# Patient Record
Sex: FEMALE | Race: WHITE | NOT HISPANIC OR LATINO | Employment: FULL TIME | URBAN - METROPOLITAN AREA
[De-identification: names, ages, dates, MRNs, and addresses within clinical notes are randomized per-mention and may not be internally consistent; named-entity substitution may affect disease eponyms.]

---

## 2017-04-06 ENCOUNTER — HOSPITAL ENCOUNTER (EMERGENCY)
Facility: HOSPITAL | Age: 27
Discharge: LEFT BEFORE DISCHARGE | End: 2017-04-07

## 2017-04-06 VITALS
BODY MASS INDEX: 20.24 KG/M2 | SYSTOLIC BLOOD PRESSURE: 122 MMHG | TEMPERATURE: 97.5 F | RESPIRATION RATE: 18 BRPM | DIASTOLIC BLOOD PRESSURE: 85 MMHG | OXYGEN SATURATION: 100 % | HEIGHT: 62 IN | HEART RATE: 82 BPM | WEIGHT: 110 LBS

## 2017-04-06 LAB
BILIRUB UR QL STRIP: NEGATIVE
CLARITY UR: ABNORMAL
COLOR UR: YELLOW
GLUCOSE UR STRIP-MCNC: NEGATIVE MG/DL
HGB UR QL STRIP.AUTO: NEGATIVE
KETONES UR QL STRIP: NEGATIVE
LEUKOCYTE ESTERASE UR QL STRIP.AUTO: NEGATIVE
NITRITE UR QL STRIP: NEGATIVE
PH UR STRIP.AUTO: 7 [PH] (ref 5–8)
PROT UR QL STRIP: NEGATIVE
SP GR UR STRIP: 1.02 (ref 1–1.03)
UROBILINOGEN UR QL STRIP: ABNORMAL

## 2017-04-06 PROCEDURE — 99211 OFF/OP EST MAY X REQ PHY/QHP: CPT

## 2017-04-06 PROCEDURE — 81003 URINALYSIS AUTO W/O SCOPE: CPT
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
## 2017-06-23 ENCOUNTER — HOSPITAL ENCOUNTER (EMERGENCY)
Facility: HOSPITAL | Age: 27
Discharge: HOME | End: 2017-06-23
Attending: EMERGENCY MEDICINE | Admitting: EMERGENCY MEDICINE

## 2017-06-23 ENCOUNTER — APPOINTMENT (OUTPATIENT)
Dept: ULTRASOUND IMAGING | Facility: HOSPITAL | Age: 27
End: 2017-06-23

## 2017-06-23 VITALS
WEIGHT: 120 LBS | BODY MASS INDEX: 22.08 KG/M2 | OXYGEN SATURATION: 99 % | HEART RATE: 76 BPM | HEIGHT: 62 IN | SYSTOLIC BLOOD PRESSURE: 104 MMHG | DIASTOLIC BLOOD PRESSURE: 67 MMHG | RESPIRATION RATE: 18 BRPM | TEMPERATURE: 98.5 F

## 2017-06-23 DIAGNOSIS — N93.9 VAGINAL SPOTTING: Primary | ICD-10-CM

## 2017-06-23 DIAGNOSIS — Z34.90 EARLY STAGE OF PREGNANCY: ICD-10-CM

## 2017-06-23 LAB
ABO GROUP BLD: NORMAL
ANION GAP SERPL CALCULATED.3IONS-SCNC: 13.2 MMOL/L
BACTERIA UR QL AUTO: NORMAL /HPF
BASOPHILS # BLD AUTO: 0.01 10*3/MM3 (ref 0–0.2)
BASOPHILS NFR BLD AUTO: 0.1 % (ref 0–1.5)
BILIRUB UR QL STRIP: NEGATIVE
BUN BLD-MCNC: 12 MG/DL (ref 6–20)
BUN/CREAT SERPL: 17.6 (ref 7–25)
CALCIUM SPEC-SCNC: 9.8 MG/DL (ref 8.6–10.5)
CHLORIDE SERPL-SCNC: 100 MMOL/L (ref 98–107)
CLARITY UR: CLEAR
CLUE CELLS SPEC QL WET PREP: ABNORMAL
CO2 SERPL-SCNC: 24.8 MMOL/L (ref 22–29)
COLOR UR: YELLOW
CREAT BLD-MCNC: 0.68 MG/DL (ref 0.57–1)
DEPRECATED RDW RBC AUTO: 45.6 FL (ref 37–54)
EOSINOPHIL # BLD AUTO: 0.1 10*3/MM3 (ref 0–0.7)
EOSINOPHIL NFR BLD AUTO: 1.3 % (ref 0.3–6.2)
ERYTHROCYTE [DISTWIDTH] IN BLOOD BY AUTOMATED COUNT: 13.6 % (ref 11.7–13)
GFR SERPL CREATININE-BSD FRML MDRD: 105 ML/MIN/1.73
GLUCOSE BLD-MCNC: 93 MG/DL (ref 65–99)
GLUCOSE UR STRIP-MCNC: NEGATIVE MG/DL
HCG INTACT+B SERPL-ACNC: 2862 MIU/ML
HCT VFR BLD AUTO: 40.7 % (ref 35.6–45.5)
HGB BLD-MCNC: 13.6 G/DL (ref 11.9–15.5)
HGB UR QL STRIP.AUTO: ABNORMAL
HOLD SPECIMEN: NORMAL
HYALINE CASTS UR QL AUTO: NORMAL /LPF
HYDATID CYST SPEC WET PREP: ABNORMAL
IMM GRANULOCYTES # BLD: 0 10*3/MM3 (ref 0–0.03)
IMM GRANULOCYTES NFR BLD: 0 % (ref 0–0.5)
KETONES UR QL STRIP: NEGATIVE
KOH PREP NAIL: NORMAL
LEUKOCYTE ESTERASE UR QL STRIP.AUTO: NEGATIVE
LYMPHOCYTES # BLD AUTO: 1.57 10*3/MM3 (ref 0.9–4.8)
LYMPHOCYTES NFR BLD AUTO: 20.3 % (ref 19.6–45.3)
MCH RBC QN AUTO: 30.3 PG (ref 26.9–32)
MCHC RBC AUTO-ENTMCNC: 33.4 G/DL (ref 32.4–36.3)
MCV RBC AUTO: 90.6 FL (ref 80.5–98.2)
MONOCYTES # BLD AUTO: 0.87 10*3/MM3 (ref 0.2–1.2)
MONOCYTES NFR BLD AUTO: 11.2 % (ref 5–12)
NEUTROPHILS # BLD AUTO: 5.19 10*3/MM3 (ref 1.9–8.1)
NEUTROPHILS NFR BLD AUTO: 67.1 % (ref 42.7–76)
NITRITE UR QL STRIP: NEGATIVE
PH UR STRIP.AUTO: 6.5 [PH] (ref 5–8)
PLATELET # BLD AUTO: 221 10*3/MM3 (ref 140–500)
PMV BLD AUTO: 9.1 FL (ref 6–12)
POTASSIUM BLD-SCNC: 3.9 MMOL/L (ref 3.5–5.2)
PROT UR QL STRIP: NEGATIVE
RBC # BLD AUTO: 4.49 10*6/MM3 (ref 3.9–5.2)
RBC # UR: NORMAL /HPF
REF LAB TEST METHOD: NORMAL
RH BLD: POSITIVE
SODIUM BLD-SCNC: 138 MMOL/L (ref 136–145)
SP GR UR STRIP: 1.02 (ref 1–1.03)
SQUAMOUS #/AREA URNS HPF: NORMAL /HPF
T VAGINALIS SPEC QL WET PREP: ABNORMAL
UROBILINOGEN UR QL STRIP: ABNORMAL
WBC NRBC COR # BLD: 7.74 10*3/MM3 (ref 4.5–10.7)
WBC SPEC QL WET PREP: ABNORMAL
WBC UR QL AUTO: NORMAL /HPF
WHOLE BLOOD HOLD SPECIMEN: NORMAL
WHOLE BLOOD HOLD SPECIMEN: NORMAL
YEAST GENITAL QL WET PREP: ABNORMAL

## 2017-06-23 PROCEDURE — 93976 VASCULAR STUDY: CPT

## 2017-06-23 PROCEDURE — 87591 N.GONORRHOEAE DNA AMP PROB: CPT | Performed by: PHYSICIAN ASSISTANT

## 2017-06-23 PROCEDURE — 36415 COLL VENOUS BLD VENIPUNCTURE: CPT | Performed by: EMERGENCY MEDICINE

## 2017-06-23 PROCEDURE — 99284 EMERGENCY DEPT VISIT MOD MDM: CPT

## 2017-06-23 PROCEDURE — 76801 OB US < 14 WKS SINGLE FETUS: CPT

## 2017-06-23 PROCEDURE — 84702 CHORIONIC GONADOTROPIN TEST: CPT | Performed by: EMERGENCY MEDICINE

## 2017-06-23 PROCEDURE — 86900 BLOOD TYPING SEROLOGIC ABO: CPT | Performed by: EMERGENCY MEDICINE

## 2017-06-23 PROCEDURE — 81001 URINALYSIS AUTO W/SCOPE: CPT | Performed by: EMERGENCY MEDICINE

## 2017-06-23 PROCEDURE — 86901 BLOOD TYPING SEROLOGIC RH(D): CPT | Performed by: EMERGENCY MEDICINE

## 2017-06-23 PROCEDURE — 87220 TISSUE EXAM FOR FUNGI: CPT | Performed by: PHYSICIAN ASSISTANT

## 2017-06-23 PROCEDURE — 80048 BASIC METABOLIC PNL TOTAL CA: CPT | Performed by: EMERGENCY MEDICINE

## 2017-06-23 PROCEDURE — 87210 SMEAR WET MOUNT SALINE/INK: CPT | Performed by: PHYSICIAN ASSISTANT

## 2017-06-23 PROCEDURE — 85025 COMPLETE CBC W/AUTO DIFF WBC: CPT | Performed by: EMERGENCY MEDICINE

## 2017-06-23 PROCEDURE — 87491 CHLMYD TRACH DNA AMP PROBE: CPT | Performed by: PHYSICIAN ASSISTANT

## 2017-06-23 PROCEDURE — 76817 TRANSVAGINAL US OBSTETRIC: CPT

## 2017-06-23 RX ORDER — SODIUM CHLORIDE 0.9 % (FLUSH) 0.9 %
10 SYRINGE (ML) INJECTION AS NEEDED
Status: DISCONTINUED | OUTPATIENT
Start: 2017-06-23 | End: 2017-06-23 | Stop reason: HOSPADM

## 2017-06-23 RX ORDER — METRONIDAZOLE 500 MG/1
2000 TABLET ORAL ONCE
Status: COMPLETED | OUTPATIENT
Start: 2017-06-23 | End: 2017-06-23

## 2017-06-23 RX ADMIN — METRONIDAZOLE 2000 MG: 500 TABLET, FILM COATED ORAL at 20:23

## 2017-06-23 NOTE — ED NOTES
Pt reports not bleeding anymore although c/o LLQ abd pain that she relates to an ovarian cyst. Reassurance given; call light in reach. Pts breathing even and unlabored. Pt appears in NAD at this time. Family at bedside.      Lata Mejia RN  06/23/17 1925

## 2017-06-23 NOTE — ED PROVIDER NOTES
EMERGENCY DEPARTMENT ENCOUNTER    CHIEF COMPLAINT  Chief Complaint: Vaginal bleeding  History given by:Pt  History limited by:None  Room Number:   PMD: Provider Not In System  Dr. Cevallos - OB/GYN    HPI:  Pt is a 27 y.o. female who presents with ittermittent bright red vaginal spotting onset a few days ago. She also states she had 1 episode of abd cramping which has resolved. She reports she is pregnant. .     Duration: few days   Timing:intermittent   Location:n/a  Radiation:n/a  Quality:bright red spotting  Intensity/Severity:mild  Progression:unchanged  Associated Symptoms:abd cramping  Aggravating Factors:none  Alleviating Factors:none  Previous Episodes:none  Treatment before arrival:none    MEDICAL RECORD REVIEW  In [facility]    PAST MEDICAL HISTORY  Active Ambulatory Problems     Diagnosis Date Noted   • No Active Ambulatory Problems     Resolved Ambulatory Problems     Diagnosis Date Noted   • No Resolved Ambulatory Problems     Past Medical History:   Diagnosis Date   • Cancer    • PTSD (post-traumatic stress disorder)        PAST SURGICAL HISTORY  History reviewed. No pertinent surgical history.    FAMILY HISTORY  Family History   Problem Relation Age of Onset   • Family history unknown: Yes       SOCIAL HISTORY  Social History     Social History   • Marital status:      Spouse name: N/A   • Number of children: N/A   • Years of education: N/A     Occupational History   • Not on file.     Social History Main Topics   • Smoking status: Current Some Day Smoker   • Smokeless tobacco: Not on file   • Alcohol use Yes      Comment: social drinker   • Drug use: Yes     Special: Marijuana   • Sexual activity: Yes     Partners: Female     Other Topics Concern   • Not on file     Social History Narrative       ALLERGIES  Orange fruit [citrus]    REVIEW OF SYSTEMS  Review of Systems   Constitutional: Negative for fever.   HENT: Negative for sore throat.    Eyes: Negative.    Respiratory: Negative for cough  and shortness of breath.    Cardiovascular: Negative for chest pain.   Gastrointestinal: Positive for abdominal pain (1 episode of cramping, resolved). Negative for diarrhea and vomiting.   Genitourinary: Positive for vaginal bleeding (spotting). Negative for dysuria.   Musculoskeletal: Negative for neck pain.   Skin: Negative for rash.   Allergic/Immunologic: Negative.    Neurological: Negative for weakness, numbness and headaches.   Hematological: Negative.    Psychiatric/Behavioral: Negative.    All other systems reviewed and are negative.      PHYSICAL EXAM  ED Triage Vitals   Temp Heart Rate Resp BP SpO2   06/23/17 1616 06/23/17 1616 06/23/17 1616 06/23/17 1659 06/23/17 1616   98 °F (36.7 °C) 106 18 116/73 100 %      Temp src Heart Rate Source Patient Position BP Location FiO2 (%)   06/23/17 1616 06/23/17 1616 -- -- --   Tympanic Monitor          Physical Exam   Constitutional: She is oriented to person, place, and time and well-developed, well-nourished, and in no distress. No distress.   HENT:   Head: Normocephalic and atraumatic.   Mouth/Throat: Oropharynx is clear and moist.   Eyes: EOM are normal.   Neck: Normal range of motion. Neck supple.   Cardiovascular: Normal rate and regular rhythm.    Pulmonary/Chest: Effort normal and breath sounds normal. No respiratory distress. She has no wheezes. She exhibits no tenderness.   Abdominal: Soft. She exhibits no distension. There is no tenderness. There is no rebound.   Genitourinary:   Genitourinary Comments: Chaparoned pelvic exam.  Cervical os is closed.  Minimal vaginal bleeding in the vaginal vault.  Bimanual exam without cervical motion tenderness, suprapubic tenderness, or adexal tenderness.  Tolerated exam well.       Musculoskeletal: Normal range of motion. She exhibits no edema.   Lymphadenopathy:     She has no cervical adenopathy.   Neurological: She is alert and oriented to person, place, and time.   Skin: Skin is warm and dry. No rash noted. No  pallor.   Psychiatric: Mood, memory, affect and judgment normal.   Nursing note and vitals reviewed.      LAB RESULTS  Recent Results (from the past 24 hour(s))   Basic Metabolic Panel    Collection Time: 06/23/17  5:09 PM   Result Value Ref Range    Glucose 93 65 - 99 mg/dL    BUN 12 6 - 20 mg/dL    Creatinine 0.68 0.57 - 1.00 mg/dL    Sodium 138 136 - 145 mmol/L    Potassium 3.9 3.5 - 5.2 mmol/L    Chloride 100 98 - 107 mmol/L    CO2 24.8 22.0 - 29.0 mmol/L    Calcium 9.8 8.6 - 10.5 mg/dL    eGFR Non African Amer 105 >60 mL/min/1.73    BUN/Creatinine Ratio 17.6 7.0 - 25.0    Anion Gap 13.2 mmol/L   hCG, Quantitative, Pregnancy    Collection Time: 06/23/17  5:09 PM   Result Value Ref Range    HCG Quantitative 2862.00 mIU/mL   ABO / Rh    Collection Time: 06/23/17  5:09 PM   Result Value Ref Range    ABO Type O     RH type Positive    Light Blue Top    Collection Time: 06/23/17  5:09 PM   Result Value Ref Range    Extra Tube hold for add-on    Green Top (Gel)    Collection Time: 06/23/17  5:09 PM   Result Value Ref Range    Extra Tube Hold for add-ons.    Lavender Top    Collection Time: 06/23/17  5:09 PM   Result Value Ref Range    Extra Tube hold for add-on    CBC Auto Differential    Collection Time: 06/23/17  5:09 PM   Result Value Ref Range    WBC 7.74 4.50 - 10.70 10*3/mm3    RBC 4.49 3.90 - 5.20 10*6/mm3    Hemoglobin 13.6 11.9 - 15.5 g/dL    Hematocrit 40.7 35.6 - 45.5 %    MCV 90.6 80.5 - 98.2 fL    MCH 30.3 26.9 - 32.0 pg    MCHC 33.4 32.4 - 36.3 g/dL    RDW 13.6 (H) 11.7 - 13.0 %    RDW-SD 45.6 37.0 - 54.0 fl    MPV 9.1 6.0 - 12.0 fL    Platelets 221 140 - 500 10*3/mm3    Neutrophil % 67.1 42.7 - 76.0 %    Lymphocyte % 20.3 19.6 - 45.3 %    Monocyte % 11.2 5.0 - 12.0 %    Eosinophil % 1.3 0.3 - 6.2 %    Basophil % 0.1 0.0 - 1.5 %    Immature Grans % 0.0 0.0 - 0.5 %    Neutrophils, Absolute 5.19 1.90 - 8.10 10*3/mm3    Lymphocytes, Absolute 1.57 0.90 - 4.80 10*3/mm3    Monocytes, Absolute 0.87 0.20 - 1.20  10*3/mm3    Eosinophils, Absolute 0.10 0.00 - 0.70 10*3/mm3    Basophils, Absolute 0.01 0.00 - 0.20 10*3/mm3    Immature Grans, Absolute 0.00 0.00 - 0.03 10*3/mm3   Urinalysis With / Culture If Indicated    Collection Time: 06/23/17  6:11 PM   Result Value Ref Range    Color, UA Yellow Yellow, Straw    Appearance, UA Clear Clear    pH, UA 6.5 5.0 - 8.0    Specific Gravity, UA 1.023 1.005 - 1.030    Glucose, UA Negative Negative    Ketones, UA Negative Negative    Bilirubin, UA Negative Negative    Blood, UA Trace (A) Negative    Protein, UA Negative Negative    Leuk Esterase, UA Negative Negative    Nitrite, UA Negative Negative    Urobilinogen, UA 0.2 E.U./dL 0.2 - 1.0 E.U./dL   Urinalysis, Microscopic Only    Collection Time: 06/23/17  6:11 PM   Result Value Ref Range    RBC, UA 0-2 None Seen, 0-2 /HPF    WBC, UA 0-2 None Seen, 0-2 /HPF    Bacteria, UA None Seen None Seen /HPF    Squamous Epithelial Cells, UA 0-2 None Seen, 0-2 /HPF    Hyaline Casts, UA None Seen None Seen /LPF    Methodology Automated Microscopy    Wet Prep, Genital    Collection Time: 06/23/17  7:27 PM   Result Value Ref Range    YEAST No yeast seen No yeast seen    HYPHAL ELEMENTS No Hyphal elements seen No Hyphal elements seen    WBC'S 2+ WBC's seen (A) No WBC's seen    Clue Cells, Wet Prep No Clue cells seen No Clue cells seen    Trichomonas, Wet Prep 2+ Trichomonas seen (A) No Trichomonas seen   KOH Prep    Collection Time: 06/23/17  7:27 PM   Result Value Ref Range    KOH Prep No yeast or hyphal elements seen No yeast or hyphal elements seen       I ordered the above labs and reviewed the results    RADIOLOGY  US Ob < 14 Weeks Single or First Gestation   Final Result       Evidence of intrauterine pregnancy. No extrauterine pregnancy is   identified. Continued close follow-up hCG and ultrasound suggested as   indications persist.       This report was finalized on 6/23/2017 7:08 PM by Dr. Yemi Aguilar MD.          US Ob Transvaginal     (Results Pending)   US Testicular or Ovarian Vascular Limited    (Results Pending)   US - gestational sac in uterus measures 5 weeks and 1 day     I ordered the above noted radiological studies and reviewed the images on the PACS system.        COURSE & MEDICAL DECISION MAKING  Pertinent Labs and Imaging studies that were ordered and reviewed are noted above.  Results were reviewed/discussed with the patient and they were also made aware of online assess.  Pt also made aware that some labs, such as cultures, will not be resulted during ER visit and follow up with PMD is necessary.       PROGRESS AND CONSULTS    Progress Notes:    1924 Offered Tylenol, pt declines.     1952 Reviewed pt's history and workup with Dr. Bobo.  After a bedside evaluation; Dr Bobo agrees with the plan of care    2004 Discussed plan to give one time treatment for mild, brewing infection. She agrees with plan.     2016 The patient's history, physical exam, and lab findings were discussed with the physician, who also performed a face to face history and physical exam.  I discussed all results and noted any abnormalities with patient.  Advised patient to have sexual partner(s) checked and treated for trich.  Discussed absoute need to recheck abnormalities with their family physician.  I answered any of the patient's questions.  Discussed plan for discharge, as there is no emergent indication for admission.  Pt is agreeable and understands need for follow up and repeat testing.  Pt is aware that discharge does not mean that nothing is wrong but it indicates no emergency is present and they must continue care with their family physician.  Pt is discharged with instructions to follow up with primary care doctor to have their blood pressure rechecked.     MEDICATIONS GIVEN IN ER  Medications   sodium chloride 0.9 % flush 10 mL (not administered)   metroNIDAZOLE (FLAGYL) tablet 2,000 mg (not administered)       /74  Pulse 85  Temp 98 °F  "(36.7 °C) (Tympanic)   Resp 18  Ht 62\" (157.5 cm)  Wt 120 lb (54.4 kg)  SpO2 98%  BMI 21.95 kg/m2      DIAGNOSIS  Final diagnoses:   Vaginal spotting   Early stage of pregnancy       FOLLOW UP   Rena Cevallos MD  [address] [id number] [phone]            RX     Medication List      Notice     No changes were made to your prescriptions during this visit.        I personally scribed for Jo Ann Welch PA-C on 6/23/2017 at 8:16 PM.  Electronically signed by Fabiola Fiore on 6/23/2017 at time 8:16 PM     Fabiola Fiore  06/23/17 2017       Jo Ann Welch PA-C  06/23/17 2018    "

## 2017-06-23 NOTE — ED NOTES
Pt noted bright red bleeding post urination about 1 time a day for a week. Today reports blood after urination every time today. And one episode of sharp cramping to LLQ.    No recent intercourse or trauma to area. No pain w urination         Caroline Van RN  17 [phone]

## 2017-06-24 NOTE — DISCHARGE INSTRUCTIONS
PLEASE READ AND REVIEW ALL DISCHARGE INSTRUCTIONS.     Please follow up with your primary care physician for any further evaluation/treatment and further management of your blood pressure.     Recheck in emergency department for any worsening and/or concerning symptoms.     Take all prescribed medicine as written and continue chronic medication.    Follow up with Dr. Cevallos, calling Monday.  Please have your partner checked/treated for trichomonas.

## 2017-06-24 NOTE — ED PROVIDER NOTES
"Pt is a pregnant female (), who presents c/o light \"bright-red\" vaginal spotting over the past few days while in early pregnancy. Normal physical exam except for spotting. US showed 5 week 1 day IUP w/o signs of ectopic. Pt's blood type is O+. Pt will be discharged home. Is + for trichomonas, will treat.    I supervised care provided by the midlevel provider.   We have discussed this patient's history, physical exam, and treatment plan.  I have reviewed the note and personally saw and examined the patient and agree with the plan of care. See attending note.     Fahad Rubio  17       Kedar Bobo MD  176    "

## 2017-06-26 LAB
C TRACH RRNA SPEC DONR QL NAA+PROBE: NEGATIVE
N GONORRHOEA DNA SPEC QL NAA+PROBE: NEGATIVE

## 2017-09-15 ENCOUNTER — HOSPITAL ENCOUNTER (OUTPATIENT)
Facility: HOSPITAL | Age: 27
Setting detail: OBSERVATION
Discharge: HOME | End: 2017-09-16
Attending: OBSTETRICS & GYNECOLOGY | Admitting: OBSTETRICS & GYNECOLOGY

## 2017-09-15 PROBLEM — Z34.90 PREGNANCY: Status: ACTIVE | Noted: 2017-09-15

## 2017-09-15 PROCEDURE — 81003 URINALYSIS AUTO W/O SCOPE: CPT | Performed by: OBSTETRICS & GYNECOLOGY

## 2017-09-15 PROCEDURE — G0378 HOSPITAL OBSERVATION PER HR: HCPCS

## 2017-09-15 RX ORDER — PRENATAL VIT/IRON FUM/FOLIC AC 27MG-0.8MG
1 TABLET ORAL DAILY
COMMUNITY
End: 2019-04-12

## 2017-09-15 RX ORDER — FOLIC ACID 1 MG/1
1 TABLET ORAL DAILY
COMMUNITY
End: 2019-04-12

## 2017-09-16 VITALS
HEIGHT: 62 IN | TEMPERATURE: 98.5 F | SYSTOLIC BLOOD PRESSURE: 119 MMHG | RESPIRATION RATE: 16 BRPM | DIASTOLIC BLOOD PRESSURE: 71 MMHG | HEART RATE: 67 BPM

## 2017-09-16 LAB
BILIRUB UR QL STRIP: NEGATIVE
CLARITY UR: CLEAR
COLOR UR: YELLOW
GLUCOSE UR STRIP-MCNC: NEGATIVE MG/DL
HGB UR QL STRIP.AUTO: NEGATIVE
KETONES UR QL STRIP: ABNORMAL
LEUKOCYTE ESTERASE UR QL STRIP.AUTO: NEGATIVE
NITRITE UR QL STRIP: NEGATIVE
PH UR STRIP.AUTO: 6 [PH] (ref 5–8)
PROT UR QL STRIP: NEGATIVE
SP GR UR STRIP: 1.02 (ref 1–1.03)
UROBILINOGEN UR QL STRIP: ABNORMAL

## 2017-11-16 ENCOUNTER — HOSPITAL ENCOUNTER (OUTPATIENT)
Facility: HOSPITAL | Age: 27
Setting detail: OBSERVATION
Discharge: HOME | End: 2017-11-16
Attending: OBSTETRICS & GYNECOLOGY | Admitting: OBSTETRICS & GYNECOLOGY

## 2017-11-16 VITALS
DIASTOLIC BLOOD PRESSURE: 63 MMHG | BODY MASS INDEX: 23.55 KG/M2 | OXYGEN SATURATION: 88 % | RESPIRATION RATE: 18 BRPM | TEMPERATURE: 98.2 F | WEIGHT: 128 LBS | HEART RATE: 75 BPM | HEIGHT: 62 IN | SYSTOLIC BLOOD PRESSURE: 114 MMHG

## 2017-11-16 LAB
ALBUMIN SERPL-MCNC: 3.6 G/DL (ref 3.5–5.2)
ALBUMIN/GLOB SERPL: 1.2 G/DL
ALP SERPL-CCNC: 74 U/L (ref 39–117)
ALT SERPL W P-5'-P-CCNC: 7 U/L (ref 1–33)
AMYLASE SERPL-CCNC: 63 U/L (ref 28–100)
ANION GAP SERPL CALCULATED.3IONS-SCNC: 11.6 MMOL/L
AST SERPL-CCNC: 11 U/L (ref 1–32)
BASOPHILS # BLD AUTO: 0.02 10*3/MM3 (ref 0–0.2)
BASOPHILS NFR BLD AUTO: 0.1 % (ref 0–1.5)
BILIRUB SERPL-MCNC: <0.2 MG/DL (ref 0.1–1.2)
BILIRUB UR QL STRIP: NEGATIVE
BUN BLD-MCNC: 7 MG/DL (ref 6–20)
BUN/CREAT SERPL: 14.6 (ref 7–25)
CALCIUM SPEC-SCNC: 9.4 MG/DL (ref 8.6–10.5)
CHLORIDE SERPL-SCNC: 102 MMOL/L (ref 98–107)
CLARITY UR: CLEAR
CO2 SERPL-SCNC: 24.4 MMOL/L (ref 22–29)
COLOR UR: YELLOW
CREAT BLD-MCNC: 0.48 MG/DL (ref 0.57–1)
DEPRECATED RDW RBC AUTO: 42.7 FL (ref 37–54)
EOSINOPHIL # BLD AUTO: 0.24 10*3/MM3 (ref 0–0.7)
EOSINOPHIL NFR BLD AUTO: 1.4 % (ref 0.3–6.2)
ERYTHROCYTE [DISTWIDTH] IN BLOOD BY AUTOMATED COUNT: 12.7 % (ref 11.7–13)
GFR SERPL CREATININE-BSD FRML MDRD: >150 ML/MIN/1.73
GLOBULIN UR ELPH-MCNC: 3 GM/DL
GLUCOSE BLD-MCNC: 76 MG/DL (ref 65–99)
GLUCOSE UR STRIP-MCNC: ABNORMAL MG/DL
HCT VFR BLD AUTO: 35.9 % (ref 35.6–45.5)
HGB BLD-MCNC: 11.9 G/DL (ref 11.9–15.5)
HGB UR QL STRIP.AUTO: NEGATIVE
IMM GRANULOCYTES # BLD: 0.06 10*3/MM3 (ref 0–0.03)
IMM GRANULOCYTES NFR BLD: 0.4 % (ref 0–0.5)
KETONES UR QL STRIP: ABNORMAL
LEUKOCYTE ESTERASE UR QL STRIP.AUTO: NEGATIVE
LIPASE SERPL-CCNC: 22 U/L (ref 13–60)
LYMPHOCYTES # BLD AUTO: 1.41 10*3/MM3 (ref 0.9–4.8)
LYMPHOCYTES NFR BLD AUTO: 8.3 % (ref 19.6–45.3)
MCH RBC QN AUTO: 30.8 PG (ref 26.9–32)
MCHC RBC AUTO-ENTMCNC: 33.1 G/DL (ref 32.4–36.3)
MCV RBC AUTO: 93 FL (ref 80.5–98.2)
MONOCYTES # BLD AUTO: 0.93 10*3/MM3 (ref 0.2–1.2)
MONOCYTES NFR BLD AUTO: 5.5 % (ref 5–12)
NEUTROPHILS # BLD AUTO: 14.3 10*3/MM3 (ref 1.9–8.1)
NEUTROPHILS NFR BLD AUTO: 84.3 % (ref 42.7–76)
NITRITE UR QL STRIP: NEGATIVE
PH UR STRIP.AUTO: 6.5 [PH] (ref 5–8)
PLATELET # BLD AUTO: 220 10*3/MM3 (ref 140–500)
PMV BLD AUTO: 9.3 FL (ref 6–12)
POTASSIUM BLD-SCNC: 3.7 MMOL/L (ref 3.5–5.2)
PROT SERPL-MCNC: 6.6 G/DL (ref 6–8.5)
PROT UR QL STRIP: NEGATIVE
RBC # BLD AUTO: 3.86 10*6/MM3 (ref 3.9–5.2)
SODIUM BLD-SCNC: 138 MMOL/L (ref 136–145)
SP GR UR STRIP: 1.01 (ref 1–1.03)
UROBILINOGEN UR QL STRIP: ABNORMAL
WBC NRBC COR # BLD: 16.96 10*3/MM3 (ref 4.5–10.7)

## 2017-11-16 PROCEDURE — 82150 ASSAY OF AMYLASE: CPT | Performed by: OBSTETRICS & GYNECOLOGY

## 2017-11-16 PROCEDURE — 85025 COMPLETE CBC W/AUTO DIFF WBC: CPT | Performed by: OBSTETRICS & GYNECOLOGY

## 2017-11-16 PROCEDURE — 80053 COMPREHEN METABOLIC PANEL: CPT | Performed by: OBSTETRICS & GYNECOLOGY

## 2017-11-16 PROCEDURE — 96360 HYDRATION IV INFUSION INIT: CPT

## 2017-11-16 PROCEDURE — G0378 HOSPITAL OBSERVATION PER HR: HCPCS

## 2017-11-16 PROCEDURE — 59025 FETAL NON-STRESS TEST: CPT

## 2017-11-16 PROCEDURE — 83690 ASSAY OF LIPASE: CPT | Performed by: OBSTETRICS & GYNECOLOGY

## 2017-11-16 PROCEDURE — 81003 URINALYSIS AUTO W/O SCOPE: CPT | Performed by: OBSTETRICS & GYNECOLOGY

## 2017-11-16 RX ORDER — SODIUM CHLORIDE 0.9 % (FLUSH) 0.9 %
1-10 SYRINGE (ML) INJECTION AS NEEDED
Status: DISCONTINUED | OUTPATIENT
Start: 2017-11-16 | End: 2017-11-16 | Stop reason: HOSPADM

## 2017-11-16 RX ORDER — ONDANSETRON 2 MG/ML
4 INJECTION INTRAMUSCULAR; INTRAVENOUS EVERY 6 HOURS PRN
Status: DISCONTINUED | OUTPATIENT
Start: 2017-11-16 | End: 2017-11-16 | Stop reason: HOSPADM

## 2017-11-16 RX ORDER — DEXTROSE, SODIUM CHLORIDE, SODIUM LACTATE, POTASSIUM CHLORIDE, AND CALCIUM CHLORIDE 5; .6; .31; .03; .02 G/100ML; G/100ML; G/100ML; G/100ML; G/100ML
125 INJECTION, SOLUTION INTRAVENOUS CONTINUOUS
Status: DISCONTINUED | OUTPATIENT
Start: 2017-11-16 | End: 2017-11-16 | Stop reason: HOSPADM

## 2017-11-16 RX ORDER — ONDANSETRON HYDROCHLORIDE 8 MG/1
TABLET, FILM COATED ORAL EVERY 8 HOURS PRN
COMMUNITY
End: 2019-05-03

## 2017-11-16 RX ADMIN — SODIUM CHLORIDE, SODIUM LACTATE, POTASSIUM CHLORIDE, CALCIUM CHLORIDE AND DEXTROSE MONOHYDRATE 999 ML/HR: 5; 600; 310; 30; 20 INJECTION, SOLUTION INTRAVENOUS at 17:49

## 2017-11-16 RX ADMIN — SODIUM CHLORIDE, SODIUM LACTATE, POTASSIUM CHLORIDE, CALCIUM CHLORIDE AND DEXTROSE MONOHYDRATE 125 ML/HR: 5; 600; 310; 30; 20 INJECTION, SOLUTION INTRAVENOUS at 18:51

## 2017-11-16 NOTE — NURSING NOTE
Pt states that she is having back pain that comes and goes now that she did not have earlier. Abdomen soft, SVE done 0.5/20/-3, No bleeding, Fetal movement noted

## 2017-11-17 NOTE — DISCHARGE INSTR - LAB
Discharge to home with instructions to keep next MD appointment and to return to Labor and Delivery with continuation of GI issues or with any concerns of self of baby.  Also instruction to return to Labor and Delivery with any contractions, rupture of membranes, or vaginal bleeding.

## 2017-11-17 NOTE — NURSING NOTE
Requesting to go home.  Discharge to home with instructions to keep next MD appointment and to return to Labor and Delivery with continuation of GI issues or with any concerns of self of baby.  Also instruction to return to Labor and Delivery with any contractions, rupture of membranes, or vaginal bleeding.  Verbalizes knowledge of home instructions.

## 2017-11-17 NOTE — NON STRESS TEST
"  María Bryant, a  at 26w4d with an JERRY of 2018, by Patient Reported, was seen at [facility] for a nonstress test.    Chief Complaint   Patient presents with   • Syncope     Patient complains of \"passing out\" 4 times today.  Keeps feeling \"woozy\" and will sit down.  Has \"even passed out sitting down\".  Not nauseous, but body will not keep food down.  Sometimes doesn't feel like eating, other times will eat and will throw back up.  Going back and forth \"drastically\" from being hot and cold.       Interpretation A  Nonstress Test Interpretation A: Reactive (17 : Joselyn Bassett RN)          "

## 2018-01-11 ENCOUNTER — HOSPITAL ENCOUNTER (OUTPATIENT)
Facility: HOSPITAL | Age: 28
Setting detail: OBSERVATION
Discharge: HOME | End: 2018-01-11
Attending: OBSTETRICS & GYNECOLOGY | Admitting: OBSTETRICS & GYNECOLOGY

## 2018-01-11 VITALS
RESPIRATION RATE: 18 BRPM | WEIGHT: 137.2 LBS | TEMPERATURE: 97.9 F | OXYGEN SATURATION: 99 % | HEIGHT: 62 IN | BODY MASS INDEX: 25.25 KG/M2

## 2018-01-11 LAB
BILIRUB UR QL STRIP: NEGATIVE
CLARITY UR: CLEAR
COLOR UR: YELLOW
GLUCOSE UR STRIP-MCNC: NEGATIVE MG/DL
HGB UR QL STRIP.AUTO: NEGATIVE
KETONES UR QL STRIP: ABNORMAL
LEUKOCYTE ESTERASE UR QL STRIP.AUTO: NEGATIVE
NITRITE UR QL STRIP: NEGATIVE
PH UR STRIP.AUTO: 6 [PH] (ref 5–8)
PROT UR QL STRIP: NEGATIVE
SP GR UR STRIP: 1.01 (ref 1–1.03)
UROBILINOGEN UR QL STRIP: ABNORMAL

## 2018-01-11 PROCEDURE — G0378 HOSPITAL OBSERVATION PER HR: HCPCS

## 2018-01-11 PROCEDURE — 59025 FETAL NON-STRESS TEST: CPT

## 2018-01-11 PROCEDURE — 81003 URINALYSIS AUTO W/O SCOPE: CPT | Performed by: OBSTETRICS & GYNECOLOGY

## 2018-01-11 RX ORDER — ACETAMINOPHEN 500 MG
1000 TABLET ORAL ONCE
Status: COMPLETED | OUTPATIENT
Start: 2018-01-11 | End: 2018-01-11

## 2018-01-11 RX ORDER — CYCLOBENZAPRINE HCL 5 MG
5 TABLET ORAL ONCE
Status: COMPLETED | OUTPATIENT
Start: 2018-01-11 | End: 2018-01-11

## 2018-01-11 RX ADMIN — CYCLOBENZAPRINE HYDROCHLORIDE 5 MG: 5 TABLET, FILM COATED ORAL at 18:43

## 2018-01-11 RX ADMIN — ACETAMINOPHEN 1000 MG: 500 TABLET ORAL at 18:43

## 2018-01-25 LAB — EXTERNAL GROUP B STREP ANTIGEN: NEGATIVE

## 2018-02-16 ENCOUNTER — HOSPITAL ENCOUNTER (INPATIENT)
Dept: LABOR AND DELIVERY | Facility: HOSPITAL | Age: 28
LOS: 2 days | Discharge: HOME | End: 2018-02-18
Attending: OBSTETRICS & GYNECOLOGY | Admitting: OBSTETRICS & GYNECOLOGY

## 2018-02-16 ENCOUNTER — ANESTHESIA EVENT (OUTPATIENT)
Dept: LABOR AND DELIVERY | Facility: HOSPITAL | Age: 28
End: 2018-02-16

## 2018-02-16 ENCOUNTER — ANESTHESIA (OUTPATIENT)
Dept: LABOR AND DELIVERY | Facility: HOSPITAL | Age: 28
End: 2018-02-16

## 2018-02-16 PROBLEM — Z34.90 PREGNANCY: Status: RESOLVED | Noted: 2017-09-15 | Resolved: 2018-02-16

## 2018-02-16 LAB
ABO GROUP BLD: NORMAL
AMPHET+METHAMPHET UR QL: NEGATIVE
BARBITURATES UR QL SCN: NEGATIVE
BENZODIAZ UR QL SCN: NEGATIVE
BLD GP AB SCN SERPL QL: NEGATIVE
CANNABINOIDS SERPL QL: POSITIVE
COCAINE UR QL: NEGATIVE
DEPRECATED RDW RBC AUTO: 43.2 FL (ref 37–54)
ERYTHROCYTE [DISTWIDTH] IN BLOOD BY AUTOMATED COUNT: 14.1 % (ref 11.7–13)
HCT VFR BLD AUTO: 35 % (ref 35.6–45.5)
HGB BLD-MCNC: 11 G/DL (ref 11.9–15.5)
MCH RBC QN AUTO: 26.2 PG (ref 26.9–32)
MCHC RBC AUTO-ENTMCNC: 31.4 G/DL (ref 32.4–36.3)
MCV RBC AUTO: 83.3 FL (ref 80.5–98.2)
METHADONE UR QL SCN: NEGATIVE
OPIATES UR QL: NEGATIVE
OXYCODONE UR QL SCN: NEGATIVE
PLATELET # BLD AUTO: 217 10*3/MM3 (ref 140–500)
PMV BLD AUTO: 9.5 FL (ref 6–12)
RBC # BLD AUTO: 4.2 10*6/MM3 (ref 3.9–5.2)
RH BLD: POSITIVE
WBC NRBC COR # BLD: 12.09 10*3/MM3 (ref 4.5–10.7)

## 2018-02-16 PROCEDURE — 80307 DRUG TEST PRSMV CHEM ANLYZR: CPT | Performed by: OBSTETRICS & GYNECOLOGY

## 2018-02-16 PROCEDURE — 86900 BLOOD TYPING SEROLOGIC ABO: CPT | Performed by: OBSTETRICS & GYNECOLOGY

## 2018-02-16 PROCEDURE — 25010000002 ROPIVACAINE PER 1 MG: Performed by: ANESTHESIOLOGY

## 2018-02-16 PROCEDURE — 86901 BLOOD TYPING SEROLOGIC RH(D): CPT | Performed by: OBSTETRICS & GYNECOLOGY

## 2018-02-16 PROCEDURE — 85027 COMPLETE CBC AUTOMATED: CPT | Performed by: OBSTETRICS & GYNECOLOGY

## 2018-02-16 PROCEDURE — G0480 DRUG TEST DEF 1-7 CLASSES: HCPCS | Performed by: OBSTETRICS & GYNECOLOGY

## 2018-02-16 PROCEDURE — 86850 RBC ANTIBODY SCREEN: CPT | Performed by: OBSTETRICS & GYNECOLOGY

## 2018-02-16 PROCEDURE — 0HQ9XZZ REPAIR PERINEUM SKIN, EXTERNAL APPROACH: ICD-10-PCS | Performed by: OBSTETRICS & GYNECOLOGY

## 2018-02-16 PROCEDURE — C1755 CATHETER, INTRASPINAL: HCPCS | Performed by: ANESTHESIOLOGY

## 2018-02-16 RX ORDER — CARBOPROST TROMETHAMINE 250 UG/ML
250 INJECTION, SOLUTION INTRAMUSCULAR AS NEEDED
Status: DISCONTINUED | OUTPATIENT
Start: 2018-02-16 | End: 2018-02-16 | Stop reason: HOSPADM

## 2018-02-16 RX ORDER — METHYLERGONOVINE MALEATE 0.2 MG/ML
200 INJECTION INTRAVENOUS ONCE AS NEEDED
Status: DISCONTINUED | OUTPATIENT
Start: 2018-02-16 | End: 2018-02-16 | Stop reason: HOSPADM

## 2018-02-16 RX ORDER — IBUPROFEN 800 MG/1
800 TABLET ORAL EVERY 8 HOURS PRN
Status: DISCONTINUED | OUTPATIENT
Start: 2018-02-16 | End: 2018-02-18 | Stop reason: HOSPADM

## 2018-02-16 RX ORDER — ONDANSETRON 2 MG/ML
4 INJECTION INTRAMUSCULAR; INTRAVENOUS EVERY 6 HOURS PRN
Status: DISCONTINUED | OUTPATIENT
Start: 2018-02-16 | End: 2018-02-18 | Stop reason: HOSPADM

## 2018-02-16 RX ORDER — MISOPROSTOL 200 UG/1
800 TABLET ORAL AS NEEDED
Status: DISCONTINUED | OUTPATIENT
Start: 2018-02-16 | End: 2018-02-16 | Stop reason: HOSPADM

## 2018-02-16 RX ORDER — LIDOCAINE HYDROCHLORIDE AND EPINEPHRINE 15; 5 MG/ML; UG/ML
INJECTION, SOLUTION EPIDURAL AS NEEDED
Status: DISCONTINUED | OUTPATIENT
Start: 2018-02-16 | End: 2018-02-16 | Stop reason: SURG

## 2018-02-16 RX ORDER — PROMETHAZINE HYDROCHLORIDE 25 MG/1
25 TABLET ORAL EVERY 6 HOURS PRN
Status: DISCONTINUED | OUTPATIENT
Start: 2018-02-16 | End: 2018-02-18 | Stop reason: HOSPADM

## 2018-02-16 RX ORDER — ONDANSETRON 2 MG/ML
4 INJECTION INTRAMUSCULAR; INTRAVENOUS ONCE AS NEEDED
Status: DISCONTINUED | OUTPATIENT
Start: 2018-02-16 | End: 2018-02-16 | Stop reason: HOSPADM

## 2018-02-16 RX ORDER — PROMETHAZINE HYDROCHLORIDE 25 MG/1
12.5 SUPPOSITORY RECTAL EVERY 6 HOURS PRN
Status: DISCONTINUED | OUTPATIENT
Start: 2018-02-16 | End: 2018-02-18 | Stop reason: HOSPADM

## 2018-02-16 RX ORDER — OXYTOCIN-SODIUM CHLORIDE 0.9% IV SOLN 30 UNIT/500ML 30-0.9/5 UT/ML-%
125 SOLUTION INTRAVENOUS CONTINUOUS PRN
Status: COMPLETED | OUTPATIENT
Start: 2018-02-16 | End: 2018-02-16

## 2018-02-16 RX ORDER — LANOLIN 100 %
OINTMENT (GRAM) TOPICAL
Status: DISCONTINUED | OUTPATIENT
Start: 2018-02-16 | End: 2018-02-18 | Stop reason: HOSPADM

## 2018-02-16 RX ORDER — OXYTOCIN-SODIUM CHLORIDE 0.9% IV SOLN 30 UNIT/500ML 30-0.9/5 UT/ML-%
999 SOLUTION INTRAVENOUS ONCE
Status: COMPLETED | OUTPATIENT
Start: 2018-02-16 | End: 2018-02-16

## 2018-02-16 RX ORDER — ERYTHROMYCIN 5 MG/G
OINTMENT OPHTHALMIC
Status: DISPENSED
Start: 2018-02-16 | End: 2018-02-17

## 2018-02-16 RX ORDER — ONDANSETRON 4 MG/1
4 TABLET, ORALLY DISINTEGRATING ORAL EVERY 6 HOURS PRN
Status: DISCONTINUED | OUTPATIENT
Start: 2018-02-16 | End: 2018-02-18 | Stop reason: HOSPADM

## 2018-02-16 RX ORDER — TERBUTALINE SULFATE 1 MG/ML
0.25 INJECTION, SOLUTION SUBCUTANEOUS AS NEEDED
Status: DISCONTINUED | OUTPATIENT
Start: 2018-02-16 | End: 2018-02-16 | Stop reason: HOSPADM

## 2018-02-16 RX ORDER — LIDOCAINE HYDROCHLORIDE 10 MG/ML
5 INJECTION, SOLUTION EPIDURAL; INFILTRATION; INTRACAUDAL; PERINEURAL AS NEEDED
Status: DISCONTINUED | OUTPATIENT
Start: 2018-02-16 | End: 2018-02-17

## 2018-02-16 RX ORDER — SODIUM CHLORIDE, SODIUM LACTATE, POTASSIUM CHLORIDE, CALCIUM CHLORIDE 600; 310; 30; 20 MG/100ML; MG/100ML; MG/100ML; MG/100ML
125 INJECTION, SOLUTION INTRAVENOUS CONTINUOUS
Status: DISCONTINUED | OUTPATIENT
Start: 2018-02-16 | End: 2018-02-18 | Stop reason: HOSPADM

## 2018-02-16 RX ORDER — BISACODYL 10 MG
10 SUPPOSITORY, RECTAL RECTAL DAILY PRN
Status: DISCONTINUED | OUTPATIENT
Start: 2018-02-17 | End: 2018-02-18 | Stop reason: HOSPADM

## 2018-02-16 RX ORDER — ONDANSETRON 4 MG/1
4 TABLET, FILM COATED ORAL EVERY 6 HOURS PRN
Status: DISCONTINUED | OUTPATIENT
Start: 2018-02-16 | End: 2018-02-18 | Stop reason: HOSPADM

## 2018-02-16 RX ORDER — FAMOTIDINE 10 MG/ML
20 INJECTION, SOLUTION INTRAVENOUS ONCE AS NEEDED
Status: DISCONTINUED | OUTPATIENT
Start: 2018-02-16 | End: 2018-02-16 | Stop reason: HOSPADM

## 2018-02-16 RX ORDER — ROPIVACAINE HYDROCHLORIDE 2 MG/ML
INJECTION, SOLUTION EPIDURAL; INFILTRATION; PERINEURAL AS NEEDED
Status: DISCONTINUED | OUTPATIENT
Start: 2018-02-16 | End: 2018-02-16 | Stop reason: SURG

## 2018-02-16 RX ORDER — SODIUM CHLORIDE, SODIUM LACTATE, POTASSIUM CHLORIDE, CALCIUM CHLORIDE 600; 310; 30; 20 MG/100ML; MG/100ML; MG/100ML; MG/100ML
125 INJECTION, SOLUTION INTRAVENOUS CONTINUOUS
Status: DISCONTINUED | OUTPATIENT
Start: 2018-02-16 | End: 2018-02-17

## 2018-02-16 RX ORDER — OXYTOCIN-SODIUM CHLORIDE 0.9% IV SOLN 30 UNIT/500ML 30-0.9/5 UT/ML-%
250 SOLUTION INTRAVENOUS CONTINUOUS
Status: ACTIVE | OUTPATIENT
Start: 2018-02-16 | End: 2018-02-16

## 2018-02-16 RX ORDER — OXYCODONE HYDROCHLORIDE AND ACETAMINOPHEN 5; 325 MG/1; MG/1
1 TABLET ORAL EVERY 4 HOURS PRN
Status: DISCONTINUED | OUTPATIENT
Start: 2018-02-16 | End: 2018-02-18 | Stop reason: HOSPADM

## 2018-02-16 RX ORDER — OXYCODONE HYDROCHLORIDE AND ACETAMINOPHEN 5; 325 MG/1; MG/1
2 TABLET ORAL EVERY 4 HOURS PRN
Status: DISCONTINUED | OUTPATIENT
Start: 2018-02-16 | End: 2018-02-18 | Stop reason: HOSPADM

## 2018-02-16 RX ORDER — OXYTOCIN-SODIUM CHLORIDE 0.9% IV SOLN 30 UNIT/500ML 30-0.9/5 UT/ML-%
2-20 SOLUTION INTRAVENOUS
Status: DISCONTINUED | OUTPATIENT
Start: 2018-02-16 | End: 2018-02-16 | Stop reason: HOSPADM

## 2018-02-16 RX ORDER — MISOPROSTOL 200 UG/1
600 TABLET ORAL ONCE AS NEEDED
Status: DISCONTINUED | OUTPATIENT
Start: 2018-02-16 | End: 2018-02-18 | Stop reason: HOSPADM

## 2018-02-16 RX ORDER — DIPHENHYDRAMINE HYDROCHLORIDE 50 MG/ML
12.5 INJECTION INTRAMUSCULAR; INTRAVENOUS EVERY 8 HOURS PRN
Status: DISCONTINUED | OUTPATIENT
Start: 2018-02-16 | End: 2018-02-16 | Stop reason: HOSPADM

## 2018-02-16 RX ORDER — EPHEDRINE SULFATE 50 MG/ML
5 INJECTION, SOLUTION INTRAVENOUS AS NEEDED
Status: DISCONTINUED | OUTPATIENT
Start: 2018-02-16 | End: 2018-02-16 | Stop reason: HOSPADM

## 2018-02-16 RX ORDER — PROMETHAZINE HYDROCHLORIDE 25 MG/ML
12.5 INJECTION, SOLUTION INTRAMUSCULAR; INTRAVENOUS EVERY 6 HOURS PRN
Status: DISCONTINUED | OUTPATIENT
Start: 2018-02-16 | End: 2018-02-18 | Stop reason: HOSPADM

## 2018-02-16 RX ORDER — ACETAMINOPHEN 325 MG/1
650 TABLET ORAL EVERY 4 HOURS PRN
Status: DISCONTINUED | OUTPATIENT
Start: 2018-02-16 | End: 2018-02-18 | Stop reason: HOSPADM

## 2018-02-16 RX ORDER — PHYTONADIONE 1 MG/.5ML
INJECTION, EMULSION INTRAMUSCULAR; INTRAVENOUS; SUBCUTANEOUS
Status: DISPENSED
Start: 2018-02-16 | End: 2018-02-17

## 2018-02-16 RX ORDER — SODIUM CHLORIDE 0.9 % (FLUSH) 0.9 %
1-10 SYRINGE (ML) INJECTION AS NEEDED
Status: DISCONTINUED | OUTPATIENT
Start: 2018-02-16 | End: 2018-02-16 | Stop reason: HOSPADM

## 2018-02-16 RX ADMIN — ROPIVACAINE HYDROCHLORIDE 10 ML: 2 INJECTION, SOLUTION EPIDURAL; INFILTRATION at 12:41

## 2018-02-16 RX ADMIN — Medication 10 ML/HR: at 12:46

## 2018-02-16 RX ADMIN — OXYTOCIN 125 ML/HR: 10 INJECTION, SOLUTION INTRAMUSCULAR; INTRAVENOUS at 19:38

## 2018-02-16 RX ADMIN — SODIUM CHLORIDE, POTASSIUM CHLORIDE, SODIUM LACTATE AND CALCIUM CHLORIDE 125 ML/HR: 600; 310; 30; 20 INJECTION, SOLUTION INTRAVENOUS at 08:45

## 2018-02-16 RX ADMIN — SODIUM CHLORIDE, POTASSIUM CHLORIDE, SODIUM LACTATE AND CALCIUM CHLORIDE 999 ML/HR: 600; 310; 30; 20 INJECTION, SOLUTION INTRAVENOUS at 11:53

## 2018-02-16 RX ADMIN — OXYTOCIN 999 ML/HR: 10 INJECTION INTRAVENOUS at 18:20

## 2018-02-16 RX ADMIN — LIDOCAINE HYDROCHLORIDE AND EPINEPHRINE 3 ML: 15; 5 INJECTION, SOLUTION EPIDURAL at 12:36

## 2018-02-16 RX ADMIN — OXYTOCIN 2 MILLI-UNITS/MIN: 10 INJECTION, SOLUTION INTRAMUSCULAR; INTRAVENOUS at 08:51

## 2018-02-16 NOTE — ANESTHESIA PROCEDURE NOTES
Labor Epidural    Patient location during procedure: OB  Start Time: 2/16/2018 12:26 PM  Preanesthetic Checklist  Completed: patient identified, site marked, surgical consent, pre-op evaluation, timeout performed, IV checked, risks and benefits discussed and monitors and equipment checked  Prep:  Sterile Tech:gloves, cap and sterile barrier  Monitoring:continuous pulse oximetry, EKG and blood pressure monitoring  Epidural Block Procedure:  Location:L3-L4  Needle Type:Tuohy  Needle Gauge:17 G  Loss of Resistance Medium: air  Aspiration:negative  Test Dose:negative  Number of Attempts: 1  Post Assessment:  Dressing:secured with tape and occlusive dressing applied  Pt Tolerance:patient tolerated the procedure well with no apparent complications  Complications:no

## 2018-02-16 NOTE — L&D DELIVERY NOTE
"[facility]  Vaginal Delivery Note    Patient Name: María Bryant  :  1990  MRN:  [id number]      Delivery     Delivery: Vaginal, Spontaneous Delivery     YOB: 2017    Time of Birth: 6:18 PM      Anesthesia: Epidural     Delivering clinician: Rena Cevallos MD       Infant    Findings: Viable female  infant    Infant observations: Weight: 2858 g (6 lb 4.8 oz)   Observations/Comments:  scale 2      Apgars: 8   @ 1 minute /    9   @ 5 minutes     Placenta, Cord, and Fluid    Placenta delivered  Spontaneous   at    6:20 PM     Cord: 3 vessels  present.   Cord blood obtained: Yes    Cord gases obtained:  No      Repair    Episiotomy: No   Lacerations: First degree midline and left labial lac   Estimated Blood Loss: 200  mls.       Delivery narrative: The patient is a 27 y.o.  at 39w5d.  Presented for IOL.  Prenatal care was complicated by history of TBI with seizure disorder and on Keppra.  Pitocin was given and SROM occurred with clear fluid.  Epidural was given. Progressed normally to C/C/+2.  Fetal status reassuring throughout.  of viable female infant  @ 6:18 PM  over an intact perineum. ASDE. 2858 g (6 lb 4.8 oz) .  Placenta delivered spontaneously, intact with 3 vessel cord. Cervix and rectum intact. First degree midline and left labial laceration repaired in usual fashion with 3-0 vicryl suture. Mother and baby recovering good condition.       Rena Cevallos MD  18  8:44 PM    Note written at time of delivery but \"refreshed\" to include baby weight.  "

## 2018-02-16 NOTE — PLAN OF CARE
Problem: Patient Care Overview (Adult)  Goal: Plan of Care Review  Outcome: Ongoing (interventions implemented as appropriate)   02/16/18 0941   Coping/Psychosocial Response Interventions   Plan Of Care Reviewed With patient;grandparent   Patient Care Overview   Progress progress toward functional goals as expected   Outcome Evaluation   Outcome Summary/Follow up Plan patient is comfortable, grandmother at bedside     Goal: Adult Individualization and Mutuality  Outcome: Ongoing (interventions implemented as appropriate)   02/16/18 0941   Individualization   Patient Specific Preferences epidural, breastfeeding, grandmother to cut cord, MAGDALENE   Patient Specific Goals pain management and healthy mom and baby   Patient Specific Interventions epidural   Mutuality/Individual Preferences   What Anxieties, Fears or Concerns Do You Have About Your Health or Care? none   What Questions Do You Have About Your Health or Care? denies   What Information Would Help Us Give You More Personalized Care? interested in paternity testing postpartum     Goal: Discharge Needs Assessment  Outcome: Ongoing (interventions implemented as appropriate)   02/16/18 0941   Discharge Needs Assessment   Concerns To Be Addressed denies needs/concerns at this time       Problem: Labor (Cervical Ripen, Induct, Augment) (Adult,Obstetrics,Pediatric)  Goal: Signs and Symptoms of Listed Potential Problems Will be Absent or Manageable (Labor)  Outcome: Ongoing (interventions implemented as appropriate)   02/16/18 0941   Labor (Cervical Ripen, Induct, Augment)   Problems Assessed (Labor) all   Problems Present (Labor) none

## 2018-02-16 NOTE — H&P
Psychiatric  Obstetric History and Physical    Chief Complaint   Patient presents with   • Scheduled Induction     term       Subjective     Patient is a 27 y.o. female  currently at 39w5d, who presents for IOL.  Pregnancy complicated by history of TBI in past and has seizures.  She is on Keppra during the pregnancy.  Has taken Zoloft as well.  Seen by MFM with no special management.    Her prenatal care is benign.  Her previous obstetric/gynecological history is noted for is non-contributory.           Prenatal Information:  Prenatal Results         Initial Prenatal Labs Ref. Range Date Time   Hemoglobin  13.6 g/dL 11.9 - 15.5 g/dL 17 1709   Hematocrit  40.7 % 35.6 - 45.5 % 17 170   Platelets  217 10*3/mm3 140 - 500 10*3/mm3 18 0806   Rubella IgG       Hepatitis B SAg       Hepatitis C Ab       RPR       ABO  O   18 075   Rh  Positive   18 075   Antibody Screen       HIV       Urine Culture       Gonorrhea  Negative  Negative 17   Chlamydia  Negative  Negative 17 192   TSH       2nd and 3rd Trimester Ref. Range Date Time   Hemoglobin (repeated)  11.0 g/dL (L) 11.9 - 15.5 g/dL 18 0806   Hematocrit (repeated)  35.0 % (L) 35.6 - 45.5 % 18 0806   GCT       Antibody Screen (repeated)  Negative   18 0751   GTT Fasting       GTT 1 Hr       GTT 2 Hr       GTT 3 Hr       Group B Strep       Drug Screening Ref. Range Date Time   Amphetamine Screen       Barbiturate Screen  Negative  Negative 18 0751   Benzodiazepine Screen  Negative  Negative 18 0751   Methadone Screen  Negative  Negative 18 0751   Phencyclidine Screen       Opiates Screen  Negative  Negative 18 0751   THC Screen  Positive  (A) Negative 18 075   Cocaine Screen  Negative  Negative 18 075   Propoxyphene Screen       Buprenorphine Screen       Methamphetamine Screen       Oxycodone Screen  Negative  Negative 18 075   Tryicyclic  Antidepressants Screen       Other (Risk screening) Ref. Range Date Time   Varicella IgG       Parvovirus IgG       CMV IgG       Cystic Fibrosis       Hemoglobin electrophoresis       NIPT       MSAFP-4       AFP (for NTD only)              Legend: ^: Historical            View all results for this pregnancy        External Prenatal Results         Pregnancy Outside Results - these were transcribed from office records.  See scanned records for details. Date Time   Hgb      Hct      ABO      Rh      Antibody Screen      Glucose Fasting GTT      Glucose Tolerance Test 1 hour      Glucose Tolerance Test 3 hour      Gonorrhea (discrete)      Chlamydia (discrete)      RPR      VDRL      Syphillis Antibody      Rubella      HBsAg      Herpes Simplex Virus PCR      Herpes Simplex VIrus Culture      HIV      Hep C RNA Quant PCR      Hep C Antibody      Urine Drug Screen      AFP      Group B Strep ^ Negative  18    GBS Susceptibility to Clindamycin      GBS Susceptibility to Eythromycin      Fetal Fibronectin      Genetic Testing, Maternal Blood             Legend: ^: Historical           Past OB History:     Obstetric History       T1      L0     SAB0   TAB0   Ectopic0   Multiple0   Live Births0       # Outcome Date GA Lbr Leonidas/2nd Weight Sex Delivery Anes PTL Lv   2 Current            1 Term 01/20/10 40w0d   M Vag-Spont EPI Y           Past Medical History: Past Medical History:   Diagnosis Date   • Abnormal Pap smear of cervix     f/u wnl   • Anxiety    • Asthma    • Cancer     cervical    • Cervical cancer    • Depression     taken zoloft in the past but not during this pregnancy   • PTSD (post-traumatic stress disorder)    • Seizures     since 20years old...last was 5 months ago   • Skull fracture     while in  in [place]   • Urinary tract infection       Past Surgical History Past Surgical History:   Procedure Laterality Date   • CERVICAL BIOPSY  W/ LOOP ELECTRODE EXCISION     • WISDOM  TOOTH EXTRACTION        Family History: Family History   Problem Relation Age of Onset   • Family history unknown: Yes      Social History:  reports that she has quit smoking. She has never used smokeless tobacco.   reports that she drinks alcohol.   reports that she uses illicit drugs, including Marijuana.        General ROS: Pertinent items are noted in HPI    Objective       Vital Signs Range for the last 24 hours  Temperature: Temp:  [97.9 °F (36.6 °C)] 97.9 °F (36.6 °C)   Temp Source: Temp src: Oral   BP: BP: (102-122)/(62-81) 114/69   Pulse: Heart Rate:  [65-93] 65   Respirations: Resp:  [18] 18   SPO2: SpO2:  [99 %] 99 %   O2 Amount (l/min):     O2 Devices O2 Device: room air   Weight: Weight:  [63.5 kg (140 lb)] 63.5 kg (140 lb)     Physical Examination: General appearance - alert, well appearing, and in no distress  Chest - clear to auscultation, no wheezes, rales or rhonchi, symmetric air entry  Heart - normal rate, regular rhythm, normal S1, S2, no murmurs, rubs, clicks or gallops  Abdomen - soft, nontender, gravid, no masses or organomegaly  Pelvic - normal external genitalia, vulva, vagina, cervix, uterus and adnexa    Presentation: cephalic   Cervix: Exam by: Method: sterile exam per RN   Dilation: Dilation: 1.5   Effacement: Cervical Effacement: 50%   Station: Station: -2 (head down verified by ultrasound, verified by LEROY Li)       Fetal Heart Rate Assessment   Method: Fetal HR Assessment Method: external   Beats/min: Fetal HR (Beats/Min): 115   Baseline: Fetal HR Baseline: normal range (110-160 bpm)   Varibility: Fetal HR Variability: moderate (amplitude range 6 to 25 bpm)   Accels: Fetal HR Accelerations: absent   Decels: Fetal HR Decelerations: absent   Tracing Category:       Uterine Assessment   Method: Method: TOCO (external toco transducer)   Frequency (min): Contraction Frequency (min): x2   Ctx Count in 10 min:     Duration: Contraction Duration (sec): 50-90   Intensity: Contraction  Intensity: mild by palpation   Intensity by IUPC:     Resting Tone: Uterine Resting Tone: soft by palpation, relaxation >60 sec   Resting Tone by IUPC:     [place] Units:           Assessment/Plan     Active Problems:    Pregnancy        Assessment:  1.  Intrauterine pregnancy at 39w5d weeks gestation with reassuring fetal status.    2.  Induction  3.  Obstetrical history significant for as above- seizure disorder well-controlled with Keppra.  4.  GBS status: negative  Plan:  1. Induction  2. Plan of care has been reviewed with patient and family.  3.  Risks, benefits of treatment plan have been discussed.  4.  All questions have been answered.        Rena Cevallos MD  2/16/2018  9:29 AM

## 2018-02-16 NOTE — PLAN OF CARE
Problem: Patient Care Overview (Adult)  Goal: Plan of Care Review  Outcome: Ongoing (interventions implemented as appropriate)   18   Coping/Psychosocial Response Interventions   Plan Of Care Reviewed With patient;grandparent;friend   Patient Care Overview   Progress progress toward functional goals as expected   Outcome Evaluation   Outcome Summary/Follow up Plan @1818     Goal: Adult Individualization and Mutuality  Outcome: Ongoing (interventions implemented as appropriate)   18   Individualization   Patient Specific Preferences MAGDALENE, breast feeding   Patient Specific Goals pain management and healthy mom and baby   Mutuality/Individual Preferences   What Anxieties, Fears or Concerns Do You Have About Your Health or Care? none   What Questions Do You Have About Your Health or Care? none   What Information Would Help Us Give You More Personalized Care? none     Goal: Discharge Needs Assessment  Outcome: Ongoing (interventions implemented as appropriate)   18   Discharge Needs Assessment   Concerns To Be Addressed no discharge needs identified       Problem: Postpartum, Vaginal Delivery (Adult)  Goal: Signs and Symptoms of Listed Potential Problems Will be Absent or Manageable (Postpartum, Vaginal Delivery)  Outcome: Ongoing (interventions implemented as appropriate)   18   Postpartum, Vaginal Delivery   Problems Assessed (Postpartum Vaginal Delivery) all   Problems Present (Postpartum Vaginal Delivery) none

## 2018-02-16 NOTE — ANESTHESIA PREPROCEDURE EVALUATION
Anesthesia Evaluation                  Airway   Mallampati: II  TM distance: >3 FB  Neck ROM: full  no difficulty expected  Dental    (+) poor dentition    Pulmonary - normal exam   (+) asthma,   Cardiovascular - normal exam        Neuro/Psych  (+) seizures,     GI/Hepatic/Renal/Endo      Musculoskeletal     Abdominal    Substance History      OB/GYN    (+) Pregnant,         Other      history of cancer                    Anesthesia Plan    ASA 3     epidural     Anesthetic plan and risks discussed with patient.

## 2018-02-17 LAB
BASOPHILS # BLD AUTO: 0.02 10*3/MM3 (ref 0–0.2)
BASOPHILS NFR BLD AUTO: 0.1 % (ref 0–1.5)
DEPRECATED RDW RBC AUTO: 43.9 FL (ref 37–54)
EOSINOPHIL # BLD AUTO: 0.12 10*3/MM3 (ref 0–0.7)
EOSINOPHIL NFR BLD AUTO: 0.6 % (ref 0.3–6.2)
ERYTHROCYTE [DISTWIDTH] IN BLOOD BY AUTOMATED COUNT: 14.2 % (ref 11.7–13)
HCT VFR BLD AUTO: 36.2 % (ref 35.6–45.5)
HGB BLD-MCNC: 11.1 G/DL (ref 11.9–15.5)
IMM GRANULOCYTES # BLD: 0.06 10*3/MM3 (ref 0–0.03)
IMM GRANULOCYTES NFR BLD: 0.3 % (ref 0–0.5)
LYMPHOCYTES # BLD AUTO: 1.49 10*3/MM3 (ref 0.9–4.8)
LYMPHOCYTES NFR BLD AUTO: 7.8 % (ref 19.6–45.3)
MCH RBC QN AUTO: 25.9 PG (ref 26.9–32)
MCHC RBC AUTO-ENTMCNC: 30.7 G/DL (ref 32.4–36.3)
MCV RBC AUTO: 84.6 FL (ref 80.5–98.2)
MONOCYTES # BLD AUTO: 1.32 10*3/MM3 (ref 0.2–1.2)
MONOCYTES NFR BLD AUTO: 6.9 % (ref 5–12)
NEUTROPHILS # BLD AUTO: 16.05 10*3/MM3 (ref 1.9–8.1)
NEUTROPHILS NFR BLD AUTO: 84.3 % (ref 42.7–76)
PLATELET # BLD AUTO: 216 10*3/MM3 (ref 140–500)
PMV BLD AUTO: 9.4 FL (ref 6–12)
RBC # BLD AUTO: 4.28 10*6/MM3 (ref 3.9–5.2)
WBC NRBC COR # BLD: 19.06 10*3/MM3 (ref 4.5–10.7)

## 2018-02-17 PROCEDURE — 85025 COMPLETE CBC W/AUTO DIFF WBC: CPT | Performed by: OBSTETRICS & GYNECOLOGY

## 2018-02-17 NOTE — ANESTHESIA POSTPROCEDURE EVALUATION
"Patient: María Bryant    Procedure Summary     Date Anesthesia Start Anesthesia Stop Room / Location    02/16/18 [id number]        Procedure Diagnosis Scheduled Providers Provider    LABOR ANALGESIA No diagnosis on file.  Varun Max MD          Anesthesia Type: epidural  Last vitals  BP   119/57 (02/16/18 1917)   Temp   36.6 °C (97.9 °F) (02/16/18 1823)   Pulse   76 (02/16/18 1917)   Resp   16 (02/16/18 1915)     SpO2   99 % (02/16/18 0734)     Post Anesthesia Care and Evaluation    Patient location during evaluation: bedside  Patient participation: complete - patient participated  Level of consciousness: awake and alert  Pain management: adequate  Airway patency: patent  Anesthetic complications: No anesthetic complications    Cardiovascular status: acceptable  Respiratory status: acceptable  Hydration status: acceptable    Comments: /57  Pulse 76  Temp 36.6 °C (97.9 °F) (Oral)   Resp 16  Ht 157.5 cm (62\")  Wt 63.5 kg (140 lb)  SpO2 99%  Breastfeeding? Yes  BMI 25.61 kg/m2      "

## 2018-02-17 NOTE — PLAN OF CARE
Problem: Patient Care Overview (Adult)  Goal: Plan of Care Review  Outcome: Ongoing (interventions implemented as appropriate)   02/17/18 0550   Coping/Psychosocial Response Interventions   Plan Of Care Reviewed With patient   Patient Care Overview   Progress progress toward functional goals as expected   Outcome Evaluation   Outcome Summary/Follow up Plan VSS, voided, no pain meds requested during shift     Goal: Adult Individualization and Mutuality  Outcome: Ongoing (interventions implemented as appropriate)    Goal: Discharge Needs Assessment  Outcome: Ongoing (interventions implemented as appropriate)      Problem: Postpartum, Vaginal Delivery (Adult)  Goal: Signs and Symptoms of Listed Potential Problems Will be Absent or Manageable (Postpartum, Vaginal Delivery)  Outcome: Ongoing (interventions implemented as appropriate)

## 2018-02-18 VITALS
HEART RATE: 66 BPM | OXYGEN SATURATION: 99 % | RESPIRATION RATE: 16 BRPM | BODY MASS INDEX: 25.76 KG/M2 | HEIGHT: 62 IN | SYSTOLIC BLOOD PRESSURE: 115 MMHG | WEIGHT: 140 LBS | DIASTOLIC BLOOD PRESSURE: 68 MMHG | TEMPERATURE: 97.9 F

## 2018-02-18 RX ORDER — ESCITALOPRAM OXALATE 10 MG/1
10 TABLET ORAL DAILY
Qty: 30 TABLET | Refills: 3 | Status: SHIPPED | OUTPATIENT
Start: 2018-02-18 | End: 2019-04-25

## 2018-02-18 RX ORDER — OXYCODONE HYDROCHLORIDE AND ACETAMINOPHEN 5; 325 MG/1; MG/1
1 TABLET ORAL EVERY 4 HOURS PRN
Qty: 30 TABLET | Refills: 0 | Status: SHIPPED | OUTPATIENT
Start: 2018-02-18 | End: 2018-02-26

## 2018-02-18 RX ORDER — IBUPROFEN 800 MG/1
800 TABLET ORAL EVERY 8 HOURS PRN
Qty: 50 TABLET | Refills: 3 | Status: SHIPPED | OUTPATIENT
Start: 2018-02-18 | End: 2019-04-25

## 2018-02-18 RX ORDER — ESCITALOPRAM OXALATE 10 MG/1
10 TABLET ORAL DAILY
Status: DISCONTINUED | OUTPATIENT
Start: 2018-02-18 | End: 2018-02-18 | Stop reason: HOSPADM

## 2018-02-18 RX ADMIN — ESCITALOPRAM 10 MG: 10 TABLET, FILM COATED ORAL at 11:43

## 2018-02-18 NOTE — CONSULTS
A 28 yo white female seen in  # 305. Pt scored a 17 on [place] Post Partum Depression screening tool. Pt states she is anxious and easily overwhelmed. Pt's speech is somewhat rapid, pleasant, agreeable. Normally poor sleep, rarely has an appetite. Has smoked marijuana to help with appetite in past. UDS is positive for THC. Pt tells me has had any since 2017. Prior to finding out she was pregnant did find that she was becoming dependent on alcohol and gradually quit drinking, none since 2017. Pt states normally exercise helps her when she's stressed but since pregnant has been unable to run which is what she likes to do. No prior mental health treatment. Pt graduated from  at age 17 and joined the [employer] at 18 and was in for 3 1/2 yrs until she received a brain injury and was medically discharged. Enjoyed being in the  and has felt kind of lost since was discharged. Pt does suffer from PTSD. Prior to pregnancy pt was working 2 full time jobs, once pregnant went to 1 job fulltime. Pt has an 9 yo son and the  and plans to live with her grandmother to help her with childcare. Pt has been in a lesbian relationship on and off for 13 yrs, they are currently . Pt has a good relationship with the father of the baby. Has a supportive extended family. Pt denies SI and HI.  Talked with pt about discharge plan. Pt is open to outpt counseling.  Pt given referrals for outpt counseling. Discussed with pt's RN, Shannan.

## 2018-02-18 NOTE — DISCHARGE SUMMARY
Date of Discharge:  2018    Discharge Diagnosis: Term pregnancy, delivered    Presenting Problem/History of Present Illness  Pregnancy [Z34.90]       Hospital Course  Patient is a 27 y.o. female presented for induction and was delivered.     Procedures Performed  Vaginal delivery       Consults:   Consults     No orders found from 2018 to 2018.          Condition on Discharge:   Subjective   Postpartum Day 2 Vaginal Delivery.    The patient feels well without complaints.    Vital Signs  Temp:  [97.9 °F (36.6 °C)-98.6 °F (37 °C)] 97.9 °F (36.6 °C)  Heart Rate:  [66-80] 66  Resp:  [16-20] 16  BP: (115-128)/(68-80) 115/68    Physical Exam:   General: Awake and alert   Abdomen: Fundus: firm, non tender    Extremities:  Calves NT bilaterally    Assessment/Plan     PPD2  S/P  -   Stable for discharge. Instructions reviewed      Discharge Disposition  Home or Self Caregood    Discharge Medications   María Bryant   Home Medication Instructions RANJEET:[id number]    Printed on:18 1123   Medication Information                      escitalopram (LEXAPRO) 10 MG tablet  Take 1 tablet by mouth Daily.             folic acid (FOLVITE) 1 MG tablet  Take 1 mg by mouth Daily.             ibuprofen (ADVIL,MOTRIN) 800 MG tablet  Take 1 tablet by mouth Every 8 (Eight) Hours As Needed for Mild Pain .             LevETIRAcetam (KEPPRA PO)  Take 50 mg by mouth 2 (Two) Times a Day.             ondansetron (ZOFRAN) 8 MG tablet  Take  by mouth Every 8 (Eight) Hours As Needed for Nausea or Vomiting.             oxyCODONE-acetaminophen (PERCOCET) 5-325 MG per tablet  Take 1 tablet by mouth Every 4 (Four) Hours As Needed for Moderate Pain  for up to 8 days.             Prenatal Vit-Fe Fumarate-FA (PRENATAL VITAMIN 27-0.8) 27-0.8 MG tablet tablet  Take 1 tablet by mouth Daily.                   The patient has been prescribed a controlled substance.  She has been counseled on the risks associated with using the  medication.   The addictive potential of this medication and alternatives were discussed carefully with this patient and she demonstrated understanding.  A GALLO report has been obtained and reviewed.       Activity at Discharge:   Vaginal rest  Follow-up Appointments  6 weeks      Test Results Pending at Discharge   Order Current Status    THC (marijuana), Urine, Confirmation - Urine, Clean Catch In process      Pt desires to restart medication for anxiety/depression.  Will start Lexapro.     Rena Cevallos MD  02/18/18  11:23 AM

## 2018-02-20 LAB
CANNABINOIDS UR QL CFM: POSITIVE
Lab: ABNORMAL
THC UR CFM-MCNC: >300 NG/ML

## 2018-03-02 ENCOUNTER — DOCUMENTATION (OUTPATIENT)
Dept: SOCIAL WORK | Facility: HOSPITAL | Age: 28
End: 2018-03-02

## 2018-03-02 NOTE — PROGRESS NOTES
Continued Stay Note       Patient Name: María Bryant  MRN: [id number]  Today's Date: 3/2/2018    Admit Date: (Not on file)          Discharge Plan     Case was accepted by [facility]  And was assigned to supervisor, Carol Moss ([phone]). She states address given was incorrect. Additional information provided ....  ELIANA Pang              Discharge Codes     None            ELIANA Pang

## 2019-04-06 ENCOUNTER — APPOINTMENT (OUTPATIENT)
Dept: ULTRASOUND IMAGING | Facility: HOSPITAL | Age: 29
End: 2019-04-06

## 2019-04-06 ENCOUNTER — HOSPITAL ENCOUNTER (EMERGENCY)
Facility: HOSPITAL | Age: 29
Discharge: HOME | End: 2019-04-06
Attending: EMERGENCY MEDICINE | Admitting: EMERGENCY MEDICINE

## 2019-04-06 VITALS
OXYGEN SATURATION: 100 % | HEIGHT: 62 IN | DIASTOLIC BLOOD PRESSURE: 79 MMHG | HEART RATE: 85 BPM | BODY MASS INDEX: 22.1 KG/M2 | SYSTOLIC BLOOD PRESSURE: 121 MMHG | RESPIRATION RATE: 16 BRPM | TEMPERATURE: 100 F | WEIGHT: 120.1 LBS

## 2019-04-06 DIAGNOSIS — O46.8X1 SUBCHORIONIC HEMORRHAGE OF PLACENTA IN FIRST TRIMESTER, SINGLE OR UNSPECIFIED FETUS: ICD-10-CM

## 2019-04-06 DIAGNOSIS — O41.8X10 SUBCHORIONIC HEMORRHAGE OF PLACENTA IN FIRST TRIMESTER, SINGLE OR UNSPECIFIED FETUS: ICD-10-CM

## 2019-04-06 DIAGNOSIS — O20.0 THREATENED ABORTION: Primary | ICD-10-CM

## 2019-04-06 LAB
ANION GAP SERPL CALCULATED.3IONS-SCNC: 14.9 MMOL/L
BASOPHILS # BLD AUTO: 0.04 10*3/MM3 (ref 0–0.2)
BASOPHILS NFR BLD AUTO: 0.4 % (ref 0–1.5)
BUN BLD-MCNC: 11 MG/DL (ref 6–20)
BUN/CREAT SERPL: 16.7 (ref 7–25)
CALCIUM SPEC-SCNC: 9.6 MG/DL (ref 8.6–10.5)
CHLORIDE SERPL-SCNC: 99 MMOL/L (ref 98–107)
CO2 SERPL-SCNC: 24.1 MMOL/L (ref 22–29)
CREAT BLD-MCNC: 0.66 MG/DL (ref 0.57–1)
DEPRECATED RDW RBC AUTO: 45.4 FL (ref 37–54)
EOSINOPHIL # BLD AUTO: 0.07 10*3/MM3 (ref 0–0.4)
EOSINOPHIL NFR BLD AUTO: 0.7 % (ref 0.3–6.2)
ERYTHROCYTE [DISTWIDTH] IN BLOOD BY AUTOMATED COUNT: 14.3 % (ref 12.3–15.4)
GFR SERPL CREATININE-BSD FRML MDRD: 107 ML/MIN/1.73
GLUCOSE BLD-MCNC: 109 MG/DL (ref 65–99)
HCG INTACT+B SERPL-ACNC: NORMAL MIU/ML
HCT VFR BLD AUTO: 41.9 % (ref 34–46.6)
HGB BLD-MCNC: 13.6 G/DL (ref 12–15.9)
IMM GRANULOCYTES # BLD AUTO: 0.04 10*3/MM3 (ref 0–0.05)
IMM GRANULOCYTES NFR BLD AUTO: 0.4 % (ref 0–0.5)
LYMPHOCYTES # BLD AUTO: 1.67 10*3/MM3 (ref 0.7–3.1)
LYMPHOCYTES NFR BLD AUTO: 16.8 % (ref 19.6–45.3)
MCH RBC QN AUTO: 28 PG (ref 26.6–33)
MCHC RBC AUTO-ENTMCNC: 32.5 G/DL (ref 31.5–35.7)
MCV RBC AUTO: 86.4 FL (ref 79–97)
MONOCYTES # BLD AUTO: 0.8 10*3/MM3 (ref 0.1–0.9)
MONOCYTES NFR BLD AUTO: 8 % (ref 5–12)
NEUTROPHILS # BLD AUTO: 7.32 10*3/MM3 (ref 1.4–7)
NEUTROPHILS NFR BLD AUTO: 73.7 % (ref 42.7–76)
NRBC BLD AUTO-RTO: 0 /100 WBC (ref 0–0)
PLATELET # BLD AUTO: 243 10*3/MM3 (ref 140–450)
PMV BLD AUTO: 9.3 FL (ref 6–12)
POTASSIUM BLD-SCNC: 3.3 MMOL/L (ref 3.5–5.2)
RBC # BLD AUTO: 4.85 10*6/MM3 (ref 3.77–5.28)
SODIUM BLD-SCNC: 138 MMOL/L (ref 136–145)
WBC NRBC COR # BLD: 9.94 10*3/MM3 (ref 3.4–10.8)

## 2019-04-06 PROCEDURE — 96375 TX/PRO/DX INJ NEW DRUG ADDON: CPT

## 2019-04-06 PROCEDURE — 76817 TRANSVAGINAL US OBSTETRIC: CPT

## 2019-04-06 PROCEDURE — 25010000002 ONDANSETRON PER 1 MG: Performed by: NURSE PRACTITIONER

## 2019-04-06 PROCEDURE — 25010000002 MORPHINE PER 10 MG: Performed by: NURSE PRACTITIONER

## 2019-04-06 PROCEDURE — 85025 COMPLETE CBC W/AUTO DIFF WBC: CPT | Performed by: NURSE PRACTITIONER

## 2019-04-06 PROCEDURE — 93976 VASCULAR STUDY: CPT

## 2019-04-06 PROCEDURE — 80048 BASIC METABOLIC PNL TOTAL CA: CPT | Performed by: NURSE PRACTITIONER

## 2019-04-06 PROCEDURE — 96374 THER/PROPH/DIAG INJ IV PUSH: CPT

## 2019-04-06 PROCEDURE — 99283 EMERGENCY DEPT VISIT LOW MDM: CPT

## 2019-04-06 PROCEDURE — 84702 CHORIONIC GONADOTROPIN TEST: CPT | Performed by: NURSE PRACTITIONER

## 2019-04-06 PROCEDURE — 76815 OB US LIMITED FETUS(S): CPT

## 2019-04-06 RX ORDER — SODIUM CHLORIDE 0.9 % (FLUSH) 0.9 %
10 SYRINGE (ML) INJECTION AS NEEDED
Status: DISCONTINUED | OUTPATIENT
Start: 2019-04-06 | End: 2019-04-07 | Stop reason: HOSPADM

## 2019-04-06 RX ORDER — MORPHINE SULFATE 2 MG/ML
2 INJECTION, SOLUTION INTRAMUSCULAR; INTRAVENOUS ONCE
Status: COMPLETED | OUTPATIENT
Start: 2019-04-06 | End: 2019-04-06

## 2019-04-06 RX ORDER — ONDANSETRON 2 MG/ML
4 INJECTION INTRAMUSCULAR; INTRAVENOUS ONCE
Status: COMPLETED | OUTPATIENT
Start: 2019-04-06 | End: 2019-04-06

## 2019-04-06 RX ADMIN — MORPHINE SULFATE 2 MG: 2 INJECTION, SOLUTION INTRAMUSCULAR; INTRAVENOUS at 20:15

## 2019-04-06 RX ADMIN — ONDANSETRON 4 MG: 2 INJECTION INTRAMUSCULAR; INTRAVENOUS at 20:15

## 2019-04-07 NOTE — DISCHARGE INSTRUCTIONS
Nothing in vagina until cleared by OB/GYN    You will continue to experience some bleeding and cramping; can take Tylenol    Return Precautions    Although you are being discharged from the ED today, I encourage you to return for worsening symptoms.  Things can, and do, change such that treatment at home with medication may not be adequate.      Specifically, return for any of the following: BLEEDING MORE THAN 2 PADS PER HOUR FOR 4 CONSECUTIVE HOURS, FEELING FAINT, PASSING OUT OR OTHER CONCERNS    Chest pain, shortness of breath, pain or nausea and vomiting not controlled by medications provided.    Please make a follow up with your Primary Care Provider for a blood pressure recheck.

## 2019-04-07 NOTE — ED PROVIDER NOTES
MD ATTESTATION NOTE    Pt presents to the ED complaining of vaginal bleeding starting this evening. Pt believes that she is six weeks pregnant (). Pt has hx of  delivery.    Reviewed pt's lab and imaging studies, including CBC and BMP. On exam, the pt is A&O x3, in no distress, belly is soft and non tender, normal neuro exam.    I agree with the plan to check hCG, check blood type, and US for further evaluation.    The MOMO and I have discussed this patient's history, physical exam, and treatment plan.  I have reviewed the documentation and personally had a face to face interaction with the patient. I affirm the documentation and agree with the treatment and plan.  The attached note describes my personal findings.    Documentation assistance provided by dao Spain for Dr. Herrera.  Information recorded by the scribe was done at my direction and has been verified and validated by me.       Heike Spain  19 [phone]       Cheo Herrera MD  19 [phone]
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
## 2019-04-12 ENCOUNTER — OFFICE VISIT (OUTPATIENT)
Dept: OBSTETRICS AND GYNECOLOGY | Facility: CLINIC | Age: 29
End: 2019-04-12

## 2019-04-12 VITALS
WEIGHT: 119 LBS | BODY MASS INDEX: 21.9 KG/M2 | DIASTOLIC BLOOD PRESSURE: 78 MMHG | HEIGHT: 62 IN | SYSTOLIC BLOOD PRESSURE: 118 MMHG

## 2019-04-12 DIAGNOSIS — O03.4 INCOMPLETE ABORTION: Primary | ICD-10-CM

## 2019-04-12 PROCEDURE — 99203 OFFICE O/P NEW LOW 30 MIN: CPT | Performed by: OBSTETRICS & GYNECOLOGY

## 2019-04-12 NOTE — PROGRESS NOTES
Subjective   María Laguerre is a 28 y.o. female.     Cc:  Miscarriage    History of Present Illness - Patient is a 28 year old female, multigravid, who presents as follow up from ER.  She had a likely miscarriage.  Patient on Depo Provera and felt pregnant over a week ago.  Home testing was positive.  [facility]  She had vaginal pain and bleeding and went to ER where ultrasound discovered an empty gestational sac.  Subsequently, bleeding worsened and patient passed multiple clots and tissue, several days later.  The follow up day, bleeding tapered off.  She denies any significant pain or bleeding currently.    The following portions of the patient's history were reviewed and updated as appropriate:   She  has a past medical history of Abnormal Pap smear of cervix, Anxiety, Asthma, Cancer (CMS/[facility]), Cervical cancer (CMS/[facility]), Depression, PTSD (post-traumatic stress disorder), Seizures (CMS/[facility]), Skull fracture (CMS/[facility]) (2009), and Urinary tract infection.  She  has a past surgical history that includes Ventress tooth extraction and Cervical biopsy w/ loop electrode excision.  Her Family history is unknown by patient.  She  reports that she has quit smoking. She has never used smokeless tobacco. She reports that she drinks alcohol. She reports that she uses drugs. Drug: Marijuana.  Current Outpatient Medications   Medication Sig Dispense Refill   • escitalopram (LEXAPRO) 10 MG tablet Take 1 tablet by mouth Daily. 30 tablet 3   • ibuprofen (ADVIL,MOTRIN) 800 MG tablet Take 1 tablet by mouth Every 8 (Eight) Hours As Needed for Mild Pain . 50 tablet 3   • ondansetron (ZOFRAN) 8 MG tablet Take  by mouth Every 8 (Eight) Hours As Needed for Nausea or Vomiting.       No current facility-administered medications for this visit.      She is allergic to orange fruit [citrus] and adhesive tape..    Review of Systems   Constitutional: Negative for chills and fever.   HENT: Negative for dental problem  and hearing loss.    Eyes: Negative for photophobia and visual disturbance.   Respiratory: Negative for chest tightness and shortness of breath.    Cardiovascular: Negative for chest pain and palpitations.   Gastrointestinal: Negative for nausea and vomiting.   Genitourinary: Negative for menstrual problem and vaginal pain.   Musculoskeletal: Negative for back pain and gait problem.   Neurological: Negative for tremors and speech difficulty.   Hematological: Negative for adenopathy. Does not bruise/bleed easily.   Psychiatric/Behavioral: Negative for behavioral problems and confusion.       Objective   Physical Exam   Constitutional: She appears well-developed and well-nourished.   HENT:   Right Ear: External ear normal.   Left Ear: External ear normal.   Nose: Nose normal.   Eyes: Conjunctivae are normal.   Neck: Normal range of motion. Neck supple. No thyromegaly present.   Cardiovascular: Normal rate, regular rhythm and normal heart sounds.   Pulmonary/Chest: Effort normal. No stridor. She has no wheezes.   Abdominal: Soft. There is no tenderness. There is no guarding.   Genitourinary: Vagina normal and uterus normal. Pelvic exam was performed with patient supine. There is no tenderness or lesion on the right labia. There is no tenderness or lesion on the left labia. Cervix exhibits no motion tenderness, no discharge and no friability. Right adnexum displays no mass and no tenderness. Left adnexum displays no mass and no tenderness.   Musculoskeletal: Normal range of motion. She exhibits no edema.   Neurological: She is alert. Coordination normal.   Skin: Skin is dry.   Psychiatric: She has a normal mood and affect. Her behavior is normal. Judgment and thought content normal.   Vitals reviewed.      Assessment/Plan   María was seen today for gynecologic exam.    Diagnoses and all orders for this visit:    Incomplete   -     CBC (No Diff)  -     HCG, B-subunit, Quantitative  -     Likely complete  , but possibility for ectopic or incomplete  exists.  Will check labs and discuss results via phone.  Consider repeat sonogram if appropriate.  Discussed contraception with patient.    Wade Jones MD

## 2019-04-15 ENCOUNTER — TELEPHONE (OUTPATIENT)
Dept: OBSTETRICS AND GYNECOLOGY | Facility: CLINIC | Age: 29
End: 2019-04-15

## 2019-04-15 NOTE — TELEPHONE ENCOUNTER
LAW    I need labs on this patient.  There are no results back.    Robert      Patient called to discuss labs on incomplete

## 2019-04-17 ENCOUNTER — PROCEDURE VISIT (OUTPATIENT)
Dept: OBSTETRICS AND GYNECOLOGY | Facility: CLINIC | Age: 29
End: 2019-04-17

## 2019-04-17 DIAGNOSIS — O36.80X0 PREGNANCY WITH UNCERTAIN FETAL VIABILITY, SINGLE OR UNSPECIFIED FETUS: Primary | ICD-10-CM

## 2019-04-17 PROCEDURE — 76817 TRANSVAGINAL US OBSTETRIC: CPT | Performed by: OBSTETRICS & GYNECOLOGY

## 2019-04-17 NOTE — TELEPHONE ENCOUNTER
Patient is wanting a call back in regards to lab work. Patient stated she updated information when she came in for her appt. I verified and we did not have correct information. This is the correct phone number Ph: [phone].       Thank you

## 2019-04-17 NOTE — TELEPHONE ENCOUNTER
Verbal order per Dr. Jones. Order ultrasound and for patient to still have labs drawn. Patient aware ultrasound today at 3:20. 4-17-19/lw

## 2019-04-17 NOTE — TELEPHONE ENCOUNTER
Spoke with patient regarding the request of lab results. Patient was in Friday for an office visit, labs were ordered, however patient left with out labs being drawn, therefore no results to be given. [facility] as [facility] had [facility] Qhcg drawn on April 10th as well, awaiting those to be faxed in. Patient stated she was not aware she needed labs that day but was a bit confused because her young child was with her and being difficult as well and she just needed to leave. She stated she will go to the [facility] when she gets off work to figure things out, I asked her not to do that wait on me to receive her [facility] from the [facility] and discuss with Dr. Jones and I would call her her back. Patient voiced understanding.

## 2019-04-22 ENCOUNTER — TELEPHONE (OUTPATIENT)
Dept: OBSTETRICS AND GYNECOLOGY | Facility: CLINIC | Age: 29
End: 2019-04-22

## 2019-04-22 NOTE — TELEPHONE ENCOUNTER
Patient is aware will try she has an appointment in three days so will follow up at appointment and she says that [facility] she says prescriptions can be faxed to them and then they take care of filling the prescription for her

## 2019-04-25 ENCOUNTER — PROCEDURE VISIT (OUTPATIENT)
Dept: OBSTETRICS AND GYNECOLOGY | Facility: CLINIC | Age: 29
End: 2019-04-25

## 2019-04-25 ENCOUNTER — INITIAL PRENATAL (OUTPATIENT)
Dept: OBSTETRICS AND GYNECOLOGY | Facility: CLINIC | Age: 29
End: 2019-04-25

## 2019-04-25 VITALS — BODY MASS INDEX: 21.39 KG/M2 | DIASTOLIC BLOOD PRESSURE: 78 MMHG | WEIGHT: 117 LBS | SYSTOLIC BLOOD PRESSURE: 122 MMHG

## 2019-04-25 DIAGNOSIS — O36.80X0 PREGNANCY WITH UNCERTAIN FETAL VIABILITY, SINGLE OR UNSPECIFIED FETUS: Primary | ICD-10-CM

## 2019-04-25 DIAGNOSIS — Z34.81 MULTIGRAVIDA IN FIRST TRIMESTER: Primary | ICD-10-CM

## 2019-04-25 DIAGNOSIS — O41.8X10 SUBCHORIONIC HEMATOMA IN FIRST TRIMESTER, SINGLE OR UNSPECIFIED FETUS: ICD-10-CM

## 2019-04-25 DIAGNOSIS — O46.8X1 SUBCHORIONIC HEMATOMA IN FIRST TRIMESTER, SINGLE OR UNSPECIFIED FETUS: ICD-10-CM

## 2019-04-25 PROCEDURE — 0501F PRENATAL FLOW SHEET: CPT | Performed by: OBSTETRICS & GYNECOLOGY

## 2019-04-25 PROCEDURE — 76817 TRANSVAGINAL US OBSTETRIC: CPT | Performed by: OBSTETRICS & GYNECOLOGY

## 2019-04-25 NOTE — PROGRESS NOTES
Cc:  New pregnancy visit  Patient was seen previously for suspected complete .  However, her follow up hCG levels were rising.  Sonogram today with viable 8 week pregnancy.  There is a subchorionic hematoma.  Patient has persistent brown discharge that is light to moderate.  No heavy bright red bleeding.  Past medical, surgical, obstetrical, genetic, social and family histories reviewed.  Allergies and medications reviewed.  Physical exam performed.  Ultrasound with 8 week viable IUP and moderate to large hematoma in placenta.  A/P:  IUP at 8 weeks with subchorionic hematoma  - Discussed significance of hematoma.  There is an increased risks of fetal loss, IUGR and  birth.  Will repeat ultrasound in 2 to 3 weeks.  - Discussed location of delivery facility and maternal well being.

## 2019-04-29 LAB
BACTERIA UR CULT: NORMAL
BACTERIA UR CULT: NORMAL

## 2019-04-30 LAB
C TRACH RRNA SPEC QL NAA+PROBE: NEGATIVE
N GONORRHOEA RRNA SPEC QL NAA+PROBE: NEGATIVE
T VAGINALIS RRNA VAG QL NAA+PROBE: NEGATIVE

## 2019-05-03 ENCOUNTER — HOSPITAL ENCOUNTER (EMERGENCY)
Facility: HOSPITAL | Age: 29
Discharge: HOME | End: 2019-05-04
Attending: EMERGENCY MEDICINE | Admitting: NURSE PRACTITIONER

## 2019-05-03 ENCOUNTER — APPOINTMENT (OUTPATIENT)
Dept: ULTRASOUND IMAGING | Facility: HOSPITAL | Age: 29
End: 2019-05-03

## 2019-05-03 DIAGNOSIS — M54.50 ACUTE RIGHT-SIDED LOW BACK PAIN WITHOUT SCIATICA: Primary | ICD-10-CM

## 2019-05-03 DIAGNOSIS — O20.0 THREATENED ABORTION: ICD-10-CM

## 2019-05-03 DIAGNOSIS — O20.9 VAGINAL BLEEDING IN PREGNANCY, FIRST TRIMESTER: ICD-10-CM

## 2019-05-03 LAB
ALBUMIN SERPL-MCNC: 4 G/DL (ref 3.5–5.2)
ALBUMIN/GLOB SERPL: 1.8 G/DL
ALP SERPL-CCNC: 40 U/L (ref 39–117)
ALT SERPL W P-5'-P-CCNC: 7 U/L (ref 1–33)
ANION GAP SERPL CALCULATED.3IONS-SCNC: 7.9 MMOL/L
AST SERPL-CCNC: 12 U/L (ref 1–32)
BASOPHILS # BLD AUTO: 0.02 10*3/MM3 (ref 0–0.2)
BASOPHILS NFR BLD AUTO: 0.3 % (ref 0–1.5)
BILIRUB SERPL-MCNC: 0.2 MG/DL (ref 0.2–1.2)
BILIRUB UR QL STRIP: NEGATIVE
BUN BLD-MCNC: 11 MG/DL (ref 6–20)
BUN/CREAT SERPL: 22.9 (ref 7–25)
CALCIUM SPEC-SCNC: 8.8 MG/DL (ref 8.6–10.5)
CHLORIDE SERPL-SCNC: 106 MMOL/L (ref 98–107)
CLARITY UR: CLEAR
CO2 SERPL-SCNC: 24.1 MMOL/L (ref 22–29)
COLOR UR: YELLOW
CREAT BLD-MCNC: 0.48 MG/DL (ref 0.57–1)
DEPRECATED RDW RBC AUTO: 43.5 FL (ref 37–54)
EOSINOPHIL # BLD AUTO: 0.14 10*3/MM3 (ref 0–0.4)
EOSINOPHIL NFR BLD AUTO: 1.8 % (ref 0.3–6.2)
ERYTHROCYTE [DISTWIDTH] IN BLOOD BY AUTOMATED COUNT: 13.5 % (ref 12.3–15.4)
GFR SERPL CREATININE-BSD FRML MDRD: >150 ML/MIN/1.73
GLOBULIN UR ELPH-MCNC: 2.2 GM/DL
GLUCOSE BLD-MCNC: 90 MG/DL (ref 65–99)
GLUCOSE UR STRIP-MCNC: NEGATIVE MG/DL
HCG INTACT+B SERPL-ACNC: NORMAL MIU/ML
HCT VFR BLD AUTO: 37.1 % (ref 34–46.6)
HGB BLD-MCNC: 12.1 G/DL (ref 12–15.9)
HGB UR QL STRIP.AUTO: NEGATIVE
IMM GRANULOCYTES # BLD AUTO: 0.02 10*3/MM3 (ref 0–0.05)
IMM GRANULOCYTES NFR BLD AUTO: 0.3 % (ref 0–0.5)
KETONES UR QL STRIP: NEGATIVE
LEUKOCYTE ESTERASE UR QL STRIP.AUTO: NEGATIVE
LYMPHOCYTES # BLD AUTO: 1.52 10*3/MM3 (ref 0.7–3.1)
LYMPHOCYTES NFR BLD AUTO: 20.1 % (ref 19.6–45.3)
MCH RBC QN AUTO: 28.5 PG (ref 26.6–33)
MCHC RBC AUTO-ENTMCNC: 32.6 G/DL (ref 31.5–35.7)
MCV RBC AUTO: 87.5 FL (ref 79–97)
MONOCYTES # BLD AUTO: 0.62 10*3/MM3 (ref 0.1–0.9)
MONOCYTES NFR BLD AUTO: 8.2 % (ref 5–12)
NEUTROPHILS # BLD AUTO: 5.26 10*3/MM3 (ref 1.7–7)
NEUTROPHILS NFR BLD AUTO: 69.3 % (ref 42.7–76)
NITRITE UR QL STRIP: NEGATIVE
NRBC BLD AUTO-RTO: 0 /100 WBC (ref 0–0.2)
PH UR STRIP.AUTO: 7 [PH] (ref 5–8)
PLATELET # BLD AUTO: 193 10*3/MM3 (ref 140–450)
PMV BLD AUTO: 9.2 FL (ref 6–12)
POTASSIUM BLD-SCNC: 4.2 MMOL/L (ref 3.5–5.2)
PROT SERPL-MCNC: 6.2 G/DL (ref 6–8.5)
PROT UR QL STRIP: NEGATIVE
RBC # BLD AUTO: 4.24 10*6/MM3 (ref 3.77–5.28)
SODIUM BLD-SCNC: 138 MMOL/L (ref 136–145)
SP GR UR STRIP: 1.02 (ref 1–1.03)
UROBILINOGEN UR QL STRIP: NORMAL
WBC NRBC COR # BLD: 7.58 10*3/MM3 (ref 3.4–10.8)

## 2019-05-03 PROCEDURE — 84702 CHORIONIC GONADOTROPIN TEST: CPT | Performed by: NURSE PRACTITIONER

## 2019-05-03 PROCEDURE — 80053 COMPREHEN METABOLIC PANEL: CPT | Performed by: NURSE PRACTITIONER

## 2019-05-03 PROCEDURE — 76775 US EXAM ABDO BACK WALL LIM: CPT

## 2019-05-03 PROCEDURE — 85025 COMPLETE CBC W/AUTO DIFF WBC: CPT | Performed by: NURSE PRACTITIONER

## 2019-05-03 PROCEDURE — 81003 URINALYSIS AUTO W/O SCOPE: CPT | Performed by: NURSE PRACTITIONER

## 2019-05-03 PROCEDURE — 99284 EMERGENCY DEPT VISIT MOD MDM: CPT

## 2019-05-03 RX ORDER — LANOLIN ALCOHOL/MO/W.PET/CERES
400 CREAM (GRAM) TOPICAL DAILY
COMMUNITY
End: 2019-11-17 | Stop reason: HOSPADM

## 2019-05-03 RX ORDER — SODIUM CHLORIDE 0.9 % (FLUSH) 0.9 %
10 SYRINGE (ML) INJECTION AS NEEDED
Status: DISCONTINUED | OUTPATIENT
Start: 2019-05-03 | End: 2019-05-04 | Stop reason: HOSPADM

## 2019-05-03 RX ORDER — ACETAMINOPHEN 325 MG/1
650 TABLET ORAL ONCE
Status: COMPLETED | OUTPATIENT
Start: 2019-05-03 | End: 2019-05-03

## 2019-05-03 RX ORDER — CYCLOBENZAPRINE HCL 10 MG
10 TABLET ORAL ONCE
Status: COMPLETED | OUTPATIENT
Start: 2019-05-03 | End: 2019-05-03

## 2019-05-03 RX ADMIN — CYCLOBENZAPRINE 10 MG: 10 TABLET, FILM COATED ORAL at 22:26

## 2019-05-03 RX ADMIN — ACETAMINOPHEN 650 MG: 325 TABLET, FILM COATED ORAL at 22:26

## 2019-05-04 VITALS
HEIGHT: 62 IN | RESPIRATION RATE: 17 BRPM | WEIGHT: 122.6 LBS | OXYGEN SATURATION: 95 % | HEART RATE: 61 BPM | DIASTOLIC BLOOD PRESSURE: 71 MMHG | BODY MASS INDEX: 22.56 KG/M2 | SYSTOLIC BLOOD PRESSURE: 118 MMHG | TEMPERATURE: 98 F

## 2019-05-04 RX ORDER — CYCLOBENZAPRINE HCL 10 MG
10 TABLET ORAL 3 TIMES DAILY PRN
Qty: 12 TABLET | Refills: 0 | Status: SHIPPED | OUTPATIENT
Start: 2019-05-04 | End: 2019-11-17 | Stop reason: HOSPADM

## 2019-05-04 NOTE — ED PROVIDER NOTES
Pt presents to the ED c/o right sided flank pain that began last night. she also complains of nausea and vomiting, but denies urinary sx. Pt states that she is currently 10.5 weeks pregnant, an was dx with a large subchorionic hemorrhage 1 month ago. Pt states that she has continued to have minimal brown vaginal discharge, but states that this is being followed closely with her OBGYN. On exam, Pt is resting comfortably, in no distress, and without focal neurologic deficit Cardiovascular exam is within normal limits and without murmur. Mild bilateral CVA tenderness. US renal shows NAD. Plan for d/c.     Attestation:  The MOMO and I have discussed this patient's history, physical exam, and treatment plan.  I have reviewed the documentation and personally had a face to face interaction with the patient. I affirm the documentation and agree with the treatment and plan.  The attached note describes my personal findings.      Documentation assistance provided by dao Hu for Dr Slater. Information recorded by the scribe was done at my direction and has been verified and validated by me.     Dorothea Hu  05/04/19 0003       Quentin Slater MD  05/04/19 0336

## 2019-05-04 NOTE — ED PROVIDER NOTES
EMERGENCY DEPARTMENT ENCOUNTER    Room Number:  32/32  Date seen:  5/3/2019  Time seen: 9:38 PM  PCP: Alyssa Lundberg MD    HPI:  Chief complaint: flank pain  Context:María Laguerre is a 28 y.o. female who presents to the ED 10.5 weeks gestation with c/o R flank pain w/o a hx of kidney stones. The pain is waxing and waning since last night. She has associated nausea and vomiting. She was recently seen for vaginal bleeding and denies any changes in the bleeding. Pt was able to f/u w/ OBGYN 2 weeks after being seen and is told she is still threatened miscarriage. She has been on bed rest. [facility]    Timing: waxing and waning  Duration: one night  Location: R flank pain  Radiation: none  Quality: pain  Intensity/Severity: moderate  Associated Symptoms: nausea, vomiting  Aggravating Factors: none  Alleviating Factors: none  Previous Episodes: none  Treatment before arrival: [facility]    ALLERGIES  Orange fruit [citrus] and Adhesive tape    PAST MEDICAL HISTORY  Active Ambulatory Problems     Diagnosis Date Noted   • No Active Ambulatory Problems     Resolved Ambulatory Problems     Diagnosis Date Noted   • Pregnancy 09/15/2017     Past Medical History:   Diagnosis Date   • Abnormal Pap smear of cervix    • Anxiety    • Asthma    • Cancer (CMS/HCC)    • Cervical cancer (CMS/HCC)    • Depression    • PTSD (post-traumatic stress disorder)    • Seizures (CMS/HCC)    • Skull fracture (CMS/HCC) 2009   • Urinary tract infection        PAST SURGICAL HISTORY  Past Surgical History:   Procedure Laterality Date   • CERVICAL BIOPSY  W/ LOOP ELECTRODE EXCISION     • WISDOM TOOTH EXTRACTION         FAMILY HISTORY  Family History   Family history unknown: Yes       SOCIAL HISTORY  Social History     Socioeconomic History   • Marital status:      Spouse name: Not on file   • Number of children: Not on file   • Years of education: Not on file   • Highest education  level: Not on file   Tobacco Use   • Smoking status: Former Smoker   • Smokeless tobacco: Never Used   Substance and Sexual Activity   • Alcohol use: No     Frequency: Never   • Drug use: Yes     Types: Marijuana     Comment: denies use since she found out she was pregnant   • Sexual activity: Yes     Partners: Female, Male       REVIEW OF SYSTEMS  Review of Systems   Constitutional: Negative for activity change, appetite change, diaphoresis and fever.   HENT: Negative for trouble swallowing.    Eyes: Negative for visual disturbance.   Respiratory: Negative for cough, chest tightness, shortness of breath and wheezing.    Cardiovascular: Negative for chest pain, palpitations and leg swelling.   Gastrointestinal: Positive for abdominal pain, nausea and vomiting. Negative for diarrhea.   Genitourinary: Positive for flank pain (Right). Negative for dysuria.   Musculoskeletal: Negative for back pain.   Skin: Negative for rash.   Neurological: Negative for dizziness, speech difficulty and light-headedness.       PHYSICAL EXAM  ED Triage Vitals [05/03/19 2124]   Temp Heart Rate Resp BP SpO2   98.3 °F (36.8 °C) 63 18 -- 100 %      Temp src Heart Rate Source Patient Position BP Location FiO2 (%)   Tympanic -- -- -- --     Physical Exam   Constitutional: She is oriented to person, place, and time and well-developed, well-nourished, and in no distress. She does not have a sickly appearance. No distress.   HENT:   Head: Normocephalic and atraumatic.   Mouth/Throat: Uvula is midline, oropharynx is clear and moist and mucous membranes are normal.   Eyes: EOM are normal. Pupils are equal, round, and reactive to light.   Neck: Normal range of motion. Neck supple.   Cardiovascular: Normal rate, regular rhythm, S1 normal, S2 normal and normal heart sounds. Exam reveals no gallop and no friction rub.   No murmur heard.  Pulmonary/Chest: Effort normal and breath sounds normal. She has no decreased breath sounds. She has no wheezes. She  has no rhonchi. She has no rales.   Abdominal: Soft. Normal appearance and bowel sounds are normal. There is no tenderness. There is no rebound and no guarding.   Genitourinary:   Genitourinary Comments: Cervix minimally open   Musculoskeletal: Normal range of motion.   perispinal tenderness to the lower right lumbar region. No erythema.   Neurological: She is alert and oriented to person, place, and time. GCS score is 15.   Skin: Skin is warm, dry and intact.   Psychiatric: Affect and judgment normal.   Nursing note and vitals reviewed.      LAB RESULTS  Recent Results (from the past 24 hour(s))   Comprehensive Metabolic Panel    Collection Time: 05/03/19  9:50 PM   Result Value Ref Range    Glucose 90 65 - 99 mg/dL    BUN 11 6 - 20 mg/dL    Creatinine 0.48 (L) 0.57 - 1.00 mg/dL    Sodium 138 136 - 145 mmol/L    Potassium 4.2 3.5 - 5.2 mmol/L    Chloride 106 98 - 107 mmol/L    CO2 24.1 22.0 - 29.0 mmol/L    Calcium 8.8 8.6 - 10.5 mg/dL    Total Protein 6.2 6.0 - 8.5 g/dL    Albumin 4.00 3.50 - 5.20 g/dL    ALT (SGPT) 7 1 - 33 U/L    AST (SGOT) 12 1 - 32 U/L    Alkaline Phosphatase 40 39 - 117 U/L    Total Bilirubin 0.2 0.2 - 1.2 mg/dL    eGFR Non African Amer >150 >60 mL/min/1.73    Globulin 2.2 gm/dL    A/G Ratio 1.8 g/dL    BUN/Creatinine Ratio 22.9 7.0 - 25.0    Anion Gap 7.9 mmol/L   hCG, Quantitative, Pregnancy    Collection Time: 05/03/19  9:50 PM   Result Value Ref Range    HCG Quantitative 59,120.00 mIU/mL   CBC Auto Differential    Collection Time: 05/03/19  9:50 PM   Result Value Ref Range    WBC 7.58 3.40 - 10.80 10*3/mm3    RBC 4.24 3.77 - 5.28 10*6/mm3    Hemoglobin 12.1 12.0 - 15.9 g/dL    Hematocrit 37.1 34.0 - 46.6 %    MCV 87.5 79.0 - 97.0 fL    MCH 28.5 26.6 - 33.0 pg    MCHC 32.6 31.5 - 35.7 g/dL    RDW 13.5 12.3 - 15.4 %    RDW-SD 43.5 37.0 - 54.0 fl    MPV 9.2 6.0 - 12.0 fL    Platelets 193 140 - 450 10*3/mm3    Neutrophil % 69.3 42.7 - 76.0 %    Lymphocyte % 20.1 19.6 - 45.3 %    Monocyte %  8.2 5.0 - 12.0 %    Eosinophil % 1.8 0.3 - 6.2 %    Basophil % 0.3 0.0 - 1.5 %    Immature Grans % 0.3 0.0 - 0.5 %    Neutrophils, Absolute 5.26 1.70 - 7.00 10*3/mm3    Lymphocytes, Absolute 1.52 0.70 - 3.10 10*3/mm3    Monocytes, Absolute 0.62 0.10 - 0.90 10*3/mm3    Eosinophils, Absolute 0.14 0.00 - 0.40 10*3/mm3    Basophils, Absolute 0.02 0.00 - 0.20 10*3/mm3    Immature Grans, Absolute 0.02 0.00 - 0.05 10*3/mm3    nRBC 0.0 0.0 - 0.2 /100 WBC   Urinalysis With Microscopic If Indicated (No Culture) - Urine, Clean Catch    Collection Time: 05/03/19 10:00 PM   Result Value Ref Range    Color, UA Yellow Yellow, Straw    Appearance, UA Clear Clear    pH, UA 7.0 5.0 - 8.0    Specific Gravity, UA 1.023 1.005 - 1.030    Glucose, UA Negative Negative    Ketones, UA Negative Negative    Bilirubin, UA Negative Negative    Blood, UA Negative Negative    Protein, UA Negative Negative    Leuk Esterase, UA Negative Negative    Nitrite, UA Negative Negative    Urobilinogen, UA 1.0 E.U./dL 0.2 - 1.0 E.U./dL       I ordered the above labs and reviewed the results    RADIOLOGY  US Renal Bilateral   Final Result   1.  Unremarkable bilateral renal ultrasound.       This report was finalized on 5/3/2019 10:24 PM by Scott Corona M.D.              I ordered the above noted radiological studies and reviewed the images on the PACS system.      MEDICATIONS GIVEN IN ER  Medications   sodium chloride 0.9 % flush 10 mL (not administered)   cyclobenzaprine (FLEXERIL) tablet 10 mg (10 mg Oral Given 5/3/19 2226)   acetaminophen (TYLENOL) tablet 650 mg (650 mg Oral Given 5/3/19 2226)       PROCEDURES  Procedures    COURSE & MEDICAL DECISION MAKING  Pertinent Labs and Imaging studies that were ordered and reviewed are noted above.  Results were reviewed/discussed with the patient and they were also made aware of online access.  Pt also made aware that some labs, such as cultures, will not be resulted during ER visit and follow up with PMD is  necessary.     PROGRESS AND CONSULTS    Progress Notes:       2217  BP- 112/74 HR- 70 Temp- 98.3 °F (36.8 °C) (Tympanic) O2 sat- 99%  Rechecked the patient who is in NAD and is resting comfortably. Discussed imaging showing there was no kidney stone and the plan to administer a muscle relaxer. Pt denies any heavy lifting. Will reevaluate and wait for additional blood work.        2320  Reviewed pt's history and workup with Dr. Slater.  After a bedside evaluation, Dr. Slater agrees with the plan of care.  Pt states pain is completely gone.  She feels much better.  We discussed her returning to work with light duty restriction and also discussed vaginal rest.  I performed a vaginal exam to evaluate her cervix with female chaperone in room. Cervix is minimally open.     2324  BP- 112/74 HR- 70 Temp- 98.3 °F (36.8 °C) (Tympanic) O2 sat- 98%  Rechecked the patient who is in NAD and is resting comfortably. Pt feels much improved and is no longer in pain. Pt given reassurance since she felt relief w/ pain medication that sx are likely musculoskeletal. Discussed pt being on pelvic rest and to have nothing inserted into the vagina until she is cleared. Pt also made aware she will be on light duty until she is cleared by her OBGYN Dr. Gordon. Pt told the plan to d/c. Pt given return to ED instructions. Pt understands and agrees with the plan, all questions answered.    The patient's history, physical exam, and lab findings were discussed with the physician, who also performed a face to face history and physical exam.  I discussed all results and noted any abnormalities with patient.  Discussed absoute need to recheck abnormalities with their family physician.  I answered any of the patient's questions.  Discussed plan for discharge, as there is no emergent indication for admission.  Pt is agreeable and understands need for follow up and repeat testing.  Pt is aware that discharge does not mean that nothing is wrong but it  "indicates no emergency is present and they must continue care with their family physician.  Pt is discharged with instructions to follow up with primary care doctor to have their blood pressure rechecked.     Disposition vitals:  /74   Pulse 70   Temp 98.3 °F (36.8 °C) (Tympanic)   Resp 16   Ht 156.2 cm (61.5\")   Wt 55.6 kg (122 lb 9.6 oz)   LMP 02/15/2019 (Exact Date) Comment: depo  SpO2 98%   BMI 22.79 kg/m²       DIAGNOSIS  Final diagnoses:   Acute right-sided low back pain without sciatica   Vaginal bleeding in pregnancy, first trimester   Threatened        FOLLOW UP   Wade Jones MD  [address] [place] [address]  [phone]      Keep appointment next week      RX     Medication List      No changes were made to your prescriptions during this visit.       Documentation assistance provided by dao Livingston for ИВАН Keith.  Information recorded by the dao was done at my direction and has been verified and validated by me.     Dina Livingston  [id number]       Hallie Clark APRN  [id number]    "

## 2019-05-04 NOTE — ED TRIAGE NOTES
Pt complains of Right flank pain that started last night and increasingly worse. Pt has nausea and vomiting but she is 10 1/2 wks pregnant. She reports a hematoma on her cervix. Pt also has vaginal pain as well.  Pt has vaginal bleeding which she reports is due to her hematoma. She reports it is not bright red blood.  [facility].

## 2019-05-04 NOTE — DISCHARGE INSTRUCTIONS
Nothing in vagina until cleared by Dr. Jones    Take Tylenol per package directions for pain    Return Precautions    Although you are being discharged from the ED today, I encourage you to return for worsening symptoms.  Things can, and do, change such that treatment at home with medication may not be adequate.      Specifically, return for any of the following: vaginal bleeding more than one pad per hour for 6 hours straight, feeling faint or passing out.    Chest pain, shortness of breath, pain or nausea and vomiting not controlled by medications provided.    Please make a follow up with your Primary Care Provider for a blood pressure recheck.

## 2019-05-09 ENCOUNTER — PROCEDURE VISIT (OUTPATIENT)
Dept: OBSTETRICS AND GYNECOLOGY | Facility: CLINIC | Age: 29
End: 2019-05-09

## 2019-05-09 ENCOUNTER — ROUTINE PRENATAL (OUTPATIENT)
Dept: OBSTETRICS AND GYNECOLOGY | Facility: CLINIC | Age: 29
End: 2019-05-09

## 2019-05-09 VITALS — WEIGHT: 115 LBS | SYSTOLIC BLOOD PRESSURE: 124 MMHG | BODY MASS INDEX: 21.38 KG/M2 | DIASTOLIC BLOOD PRESSURE: 70 MMHG

## 2019-05-09 DIAGNOSIS — O46.8X1 SUBCHORIONIC HEMATOMA IN FIRST TRIMESTER, SINGLE OR UNSPECIFIED FETUS: Primary | ICD-10-CM

## 2019-05-09 DIAGNOSIS — O41.8X10 SUBCHORIONIC HEMATOMA IN FIRST TRIMESTER, SINGLE OR UNSPECIFIED FETUS: Primary | ICD-10-CM

## 2019-05-09 DIAGNOSIS — Z34.81 MULTIGRAVIDA IN FIRST TRIMESTER: ICD-10-CM

## 2019-05-09 LAB
BILIRUB BLD-MCNC: NEGATIVE MG/DL
GLUCOSE UR STRIP-MCNC: NEGATIVE MG/DL
KETONES UR QL: NEGATIVE
LEUKOCYTE EST, POC: NEGATIVE
NITRITE UR-MCNC: NEGATIVE MG/ML
PH UR: 6.5 [PH] (ref 5–8)
PROT UR STRIP-MCNC: ABNORMAL MG/DL
RBC # UR STRIP: NEGATIVE /UL
SP GR UR: 1.02 (ref 1–1.03)
UROBILINOGEN UR QL: NORMAL

## 2019-05-09 PROCEDURE — 76817 TRANSVAGINAL US OBSTETRIC: CPT | Performed by: OBSTETRICS & GYNECOLOGY

## 2019-05-09 PROCEDURE — 0502F SUBSEQUENT PRENATAL CARE: CPT | Performed by: OBSTETRICS & GYNECOLOGY

## 2019-05-09 PROCEDURE — 81003 URINALYSIS AUTO W/O SCOPE: CPT | Performed by: OBSTETRICS & GYNECOLOGY

## 2019-05-09 NOTE — PROGRESS NOTES
"Cc:  Pregnancy follow up.  Patient states her \"kidneys hurt\" and she does not have much urine output when she attempts to urinate; [facility]. She also states she has not had a bm in 4 days. Not able to leave sample today.  She underwent a urinalysis, CBC and CMP within past week at [facility];  These were all normal.  Patient had bleeding with intercourse recently, but this has improved.  Ultrasound today with normal cervical length and no evidence of hematoma.  Exam - Gen:  No distress.  Back:  Mild tenderness to palpation but nonlocalized  Prenatal labs drawn, including urine culture.  Stop prenatal vitamins  Increase hydration  Follow up in 2 weeks.  "

## 2019-05-10 LAB
ABO GROUP BLD: (no result)
BASOPHILS # BLD AUTO: 0 X10E3/UL (ref 0–0.2)
BASOPHILS NFR BLD AUTO: 0 %
BLD GP AB SCN SERPL QL: NEGATIVE
EOSINOPHIL # BLD AUTO: 0.1 X10E3/UL (ref 0–0.4)
EOSINOPHIL NFR BLD AUTO: 1 %
ERYTHROCYTE [DISTWIDTH] IN BLOOD BY AUTOMATED COUNT: 15.1 % (ref 12.3–15.4)
HBV SURFACE AG SERPL QL IA: NEGATIVE
HCT VFR BLD AUTO: 40.7 % (ref 34–46.6)
HCV AB S/CO SERPL IA: <0.1 S/CO RATIO (ref 0–0.9)
HGB BLD-MCNC: 13.4 G/DL (ref 11.1–15.9)
HIV 1+2 AB+HIV1 P24 AG SERPL QL IA: NON REACTIVE
IMM GRANULOCYTES # BLD AUTO: 0 X10E3/UL (ref 0–0.1)
IMM GRANULOCYTES NFR BLD AUTO: 0 %
LYMPHOCYTES # BLD AUTO: 1 X10E3/UL (ref 0.7–3.1)
LYMPHOCYTES NFR BLD AUTO: 17 %
MCH RBC QN AUTO: 28.5 PG (ref 26.6–33)
MCHC RBC AUTO-ENTMCNC: 32.9 G/DL (ref 31.5–35.7)
MCV RBC AUTO: 86 FL (ref 79–97)
MONOCYTES # BLD AUTO: 0.5 X10E3/UL (ref 0.1–0.9)
MONOCYTES NFR BLD AUTO: 9 %
NEUTROPHILS # BLD AUTO: 4.4 X10E3/UL (ref 1.4–7)
NEUTROPHILS NFR BLD AUTO: 73 %
PLATELET # BLD AUTO: 266 X10E3/UL (ref 150–379)
RBC # BLD AUTO: 4.71 X10E6/UL (ref 3.77–5.28)
RH BLD: POSITIVE
RPR SER QL: NON REACTIVE
RUBV IGG SERPL IA-ACNC: 3.49 INDEX
WBC # BLD AUTO: 6 X10E3/UL (ref 3.4–10.8)

## 2019-05-11 LAB
BACTERIA UR CULT: NORMAL
BACTERIA UR CULT: NORMAL

## 2019-05-15 LAB
CFTR MUT ANL BLD/T: NORMAL
LABORATORY COMMENT REPORT: NORMAL

## 2019-07-14 ENCOUNTER — HOSPITAL ENCOUNTER (OUTPATIENT)
Facility: HOSPITAL | Age: 29
Setting detail: OBSERVATION
Discharge: HOME | End: 2019-07-15
Attending: OBSTETRICS & GYNECOLOGY | Admitting: OBSTETRICS & GYNECOLOGY

## 2019-07-14 PROBLEM — Z34.90 PREGNANCY: Status: ACTIVE | Noted: 2019-07-14

## 2019-07-14 LAB
ABO GROUP BLD: NORMAL
ALBUMIN SERPL-MCNC: 3.5 G/DL (ref 3.5–5.2)
ALBUMIN/GLOB SERPL: 1.3 G/DL
ALP SERPL-CCNC: 50 U/L (ref 39–117)
ALT SERPL W P-5'-P-CCNC: 7 U/L (ref 1–33)
ANION GAP SERPL CALCULATED.3IONS-SCNC: 10.7 MMOL/L (ref 5–15)
AST SERPL-CCNC: 11 U/L (ref 1–32)
BASOPHILS # BLD AUTO: 0.02 10*3/MM3 (ref 0–0.2)
BASOPHILS NFR BLD AUTO: 0.2 % (ref 0–1.5)
BILIRUB SERPL-MCNC: <0.2 MG/DL (ref 0.2–1.2)
BLD GP AB SCN SERPL QL: NEGATIVE
BUN BLD-MCNC: 8 MG/DL (ref 6–20)
BUN/CREAT SERPL: 14 (ref 7–25)
CALCIUM SPEC-SCNC: 8.8 MG/DL (ref 8.6–10.5)
CHLORIDE SERPL-SCNC: 101 MMOL/L (ref 98–107)
CO2 SERPL-SCNC: 23.3 MMOL/L (ref 22–29)
CREAT BLD-MCNC: 0.57 MG/DL (ref 0.57–1)
DEPRECATED RDW RBC AUTO: 43.9 FL (ref 37–54)
EOSINOPHIL # BLD AUTO: 0.16 10*3/MM3 (ref 0–0.4)
EOSINOPHIL NFR BLD AUTO: 2 % (ref 0.3–6.2)
ERYTHROCYTE [DISTWIDTH] IN BLOOD BY AUTOMATED COUNT: 13.1 % (ref 12.3–15.4)
GFR SERPL CREATININE-BSD FRML MDRD: 126 ML/MIN/1.73
GLOBULIN UR ELPH-MCNC: 2.6 GM/DL
GLUCOSE BLD-MCNC: 105 MG/DL (ref 65–99)
HCT VFR BLD AUTO: 32.5 % (ref 34–46.6)
HGB BLD-MCNC: 10.5 G/DL (ref 12–15.9)
IMM GRANULOCYTES # BLD AUTO: 0.04 10*3/MM3 (ref 0–0.05)
IMM GRANULOCYTES NFR BLD AUTO: 0.5 % (ref 0–0.5)
LYMPHOCYTES # BLD AUTO: 1.78 10*3/MM3 (ref 0.7–3.1)
LYMPHOCYTES NFR BLD AUTO: 21.9 % (ref 19.6–45.3)
MCH RBC QN AUTO: 29.6 PG (ref 26.6–33)
MCHC RBC AUTO-ENTMCNC: 32.3 G/DL (ref 31.5–35.7)
MCV RBC AUTO: 91.5 FL (ref 79–97)
MONOCYTES # BLD AUTO: 0.57 10*3/MM3 (ref 0.1–0.9)
MONOCYTES NFR BLD AUTO: 7 % (ref 5–12)
NEUTROPHILS # BLD AUTO: 5.57 10*3/MM3 (ref 1.7–7)
NEUTROPHILS NFR BLD AUTO: 68.4 % (ref 42.7–76)
NRBC BLD AUTO-RTO: 0 /100 WBC (ref 0–0.2)
PLATELET # BLD AUTO: 175 10*3/MM3 (ref 140–450)
PMV BLD AUTO: 9.3 FL (ref 6–12)
POTASSIUM BLD-SCNC: 2.6 MMOL/L (ref 3.5–5.2)
PROT SERPL-MCNC: 6.1 G/DL (ref 6–8.5)
RBC # BLD AUTO: 3.55 10*6/MM3 (ref 3.77–5.28)
RH BLD: POSITIVE
SODIUM BLD-SCNC: 135 MMOL/L (ref 136–145)
T&S EXPIRATION DATE: NORMAL
WBC NRBC COR # BLD: 8.14 10*3/MM3 (ref 3.4–10.8)

## 2019-07-14 PROCEDURE — 80053 COMPREHEN METABOLIC PANEL: CPT | Performed by: OBSTETRICS & GYNECOLOGY

## 2019-07-14 PROCEDURE — 96361 HYDRATE IV INFUSION ADD-ON: CPT

## 2019-07-14 PROCEDURE — 86850 RBC ANTIBODY SCREEN: CPT | Performed by: OBSTETRICS & GYNECOLOGY

## 2019-07-14 PROCEDURE — 86901 BLOOD TYPING SEROLOGIC RH(D): CPT | Performed by: OBSTETRICS & GYNECOLOGY

## 2019-07-14 PROCEDURE — 25010000002 TERBUTALINE PER 1 MG: Performed by: OBSTETRICS & GYNECOLOGY

## 2019-07-14 PROCEDURE — 86900 BLOOD TYPING SEROLOGIC ABO: CPT | Performed by: OBSTETRICS & GYNECOLOGY

## 2019-07-14 PROCEDURE — G0378 HOSPITAL OBSERVATION PER HR: HCPCS

## 2019-07-14 PROCEDURE — 59025 FETAL NON-STRESS TEST: CPT

## 2019-07-14 PROCEDURE — 85025 COMPLETE CBC W/AUTO DIFF WBC: CPT | Performed by: OBSTETRICS & GYNECOLOGY

## 2019-07-14 PROCEDURE — 96372 THER/PROPH/DIAG INJ SC/IM: CPT

## 2019-07-14 RX ORDER — MAGNESIUM SULFATE HEPTAHYDRATE 40 MG/ML
3 INJECTION, SOLUTION INTRAVENOUS CONTINUOUS
Status: DISCONTINUED | OUTPATIENT
Start: 2019-07-14 | End: 2019-07-15 | Stop reason: HOSPADM

## 2019-07-14 RX ORDER — ACETAMINOPHEN 500 MG
1000 TABLET ORAL ONCE
Status: COMPLETED | OUTPATIENT
Start: 2019-07-14 | End: 2019-07-14

## 2019-07-14 RX ORDER — POTASSIUM CHLORIDE 750 MG/1
40 CAPSULE, EXTENDED RELEASE ORAL EVERY 4 HOURS
Status: DISCONTINUED | OUTPATIENT
Start: 2019-07-15 | End: 2019-07-15 | Stop reason: HOSPADM

## 2019-07-14 RX ORDER — MAGNESIUM SULFATE HEPTAHYDRATE 40 MG/ML
6 INJECTION, SOLUTION INTRAVENOUS ONCE
Status: DISCONTINUED | OUTPATIENT
Start: 2019-07-14 | End: 2019-07-15 | Stop reason: HOSPADM

## 2019-07-14 RX ORDER — MAGNESIUM SULFATE HEPTAHYDRATE 40 MG/ML
3 INJECTION, SOLUTION INTRAVENOUS CONTINUOUS
Status: DISCONTINUED | OUTPATIENT
Start: 2019-07-14 | End: 2019-07-14

## 2019-07-14 RX ORDER — TERBUTALINE SULFATE 1 MG/ML
0.25 INJECTION, SOLUTION SUBCUTANEOUS AS NEEDED
Status: DISCONTINUED | OUTPATIENT
Start: 2019-07-14 | End: 2019-07-15 | Stop reason: HOSPADM

## 2019-07-14 RX ORDER — SODIUM CHLORIDE, SODIUM LACTATE, POTASSIUM CHLORIDE, CALCIUM CHLORIDE 600; 310; 30; 20 MG/100ML; MG/100ML; MG/100ML; MG/100ML
75 INJECTION, SOLUTION INTRAVENOUS CONTINUOUS
Status: DISCONTINUED | OUTPATIENT
Start: 2019-07-14 | End: 2019-07-15

## 2019-07-14 RX ORDER — MAGNESIUM SULFATE 1 G/100ML
3 INJECTION INTRAVENOUS CONTINUOUS
Status: DISCONTINUED | OUTPATIENT
Start: 2019-07-14 | End: 2019-07-14

## 2019-07-14 RX ORDER — TERBUTALINE SULFATE 1 MG/ML
0.25 INJECTION, SOLUTION SUBCUTANEOUS
Status: DISCONTINUED | OUTPATIENT
Start: 2019-07-14 | End: 2019-07-15 | Stop reason: HOSPADM

## 2019-07-14 RX ORDER — TERBUTALINE SULFATE 1 MG/ML
0.25 INJECTION, SOLUTION SUBCUTANEOUS AS NEEDED
Status: DISCONTINUED | OUTPATIENT
Start: 2019-07-14 | End: 2019-07-14 | Stop reason: CLARIF

## 2019-07-14 RX ORDER — SODIUM CHLORIDE 0.9 % (FLUSH) 0.9 %
5 SYRINGE (ML) INJECTION AS NEEDED
Status: DISCONTINUED | OUTPATIENT
Start: 2019-07-14 | End: 2019-07-15 | Stop reason: HOSPADM

## 2019-07-14 RX ORDER — NIFEDIPINE 10 MG/1
10 CAPSULE ORAL ONCE AS NEEDED
Status: COMPLETED | OUTPATIENT
Start: 2019-07-14 | End: 2019-07-15

## 2019-07-14 RX ORDER — SODIUM CHLORIDE 0.9 % (FLUSH) 0.9 %
3 SYRINGE (ML) INJECTION EVERY 12 HOURS SCHEDULED
Status: DISCONTINUED | OUTPATIENT
Start: 2019-07-14 | End: 2019-07-15 | Stop reason: HOSPADM

## 2019-07-14 RX ADMIN — SODIUM CHLORIDE, POTASSIUM CHLORIDE, SODIUM LACTATE AND CALCIUM CHLORIDE 75 ML/HR: 600; 310; 30; 20 INJECTION, SOLUTION INTRAVENOUS at 22:37

## 2019-07-14 RX ADMIN — TERBUTALINE SULFATE 0.25 MG: 1 INJECTION SUBCUTANEOUS at 21:44

## 2019-07-14 RX ADMIN — ACETAMINOPHEN 1000 MG: 500 TABLET, FILM COATED ORAL at 19:57

## 2019-07-14 RX ADMIN — TERBUTALINE SULFATE 0.25 MG: 1 INJECTION SUBCUTANEOUS at 21:12

## 2019-07-15 ENCOUNTER — APPOINTMENT (OUTPATIENT)
Dept: ULTRASOUND IMAGING | Facility: HOSPITAL | Age: 29
End: 2019-07-15

## 2019-07-15 VITALS
HEIGHT: 61 IN | DIASTOLIC BLOOD PRESSURE: 44 MMHG | OXYGEN SATURATION: 99 % | SYSTOLIC BLOOD PRESSURE: 82 MMHG | TEMPERATURE: 98.1 F | BODY MASS INDEX: 23.27 KG/M2 | HEART RATE: 61 BPM | WEIGHT: 123.25 LBS | RESPIRATION RATE: 18 BRPM

## 2019-07-15 LAB
ALBUMIN SERPL-MCNC: 3.5 G/DL (ref 3.5–5.2)
ALBUMIN/GLOB SERPL: 1.4 G/DL
ALP SERPL-CCNC: 48 U/L (ref 39–117)
ALT SERPL W P-5'-P-CCNC: 6 U/L (ref 1–33)
ANION GAP SERPL CALCULATED.3IONS-SCNC: 10.3 MMOL/L (ref 5–15)
AST SERPL-CCNC: 10 U/L (ref 1–32)
BILIRUB SERPL-MCNC: 0.2 MG/DL (ref 0.2–1.2)
BILIRUB UR QL STRIP: NEGATIVE
BUN BLD-MCNC: 4 MG/DL (ref 6–20)
BUN/CREAT SERPL: 7.7 (ref 7–25)
CALCIUM SPEC-SCNC: 8.9 MG/DL (ref 8.6–10.5)
CHLORIDE SERPL-SCNC: 103 MMOL/L (ref 98–107)
CLARITY UR: CLEAR
CO2 SERPL-SCNC: 21.7 MMOL/L (ref 22–29)
COLOR UR: YELLOW
CREAT BLD-MCNC: 0.52 MG/DL (ref 0.57–1)
GFR SERPL CREATININE-BSD FRML MDRD: 140 ML/MIN/1.73
GLOBULIN UR ELPH-MCNC: 2.5 GM/DL
GLUCOSE BLD-MCNC: 91 MG/DL (ref 65–99)
GLUCOSE UR STRIP-MCNC: NEGATIVE MG/DL
HGB UR QL STRIP.AUTO: NEGATIVE
KETONES UR QL STRIP: NEGATIVE
LEUKOCYTE ESTERASE UR QL STRIP.AUTO: NEGATIVE
NITRITE UR QL STRIP: NEGATIVE
PH UR STRIP.AUTO: 8.5 [PH] (ref 5–8)
POTASSIUM BLD-SCNC: 4 MMOL/L (ref 3.5–5.2)
PROT SERPL-MCNC: 6 G/DL (ref 6–8.5)
PROT UR QL STRIP: NEGATIVE
SODIUM BLD-SCNC: 135 MMOL/L (ref 136–145)
SP GR UR STRIP: 1.01 (ref 1–1.03)
UROBILINOGEN UR QL STRIP: ABNORMAL

## 2019-07-15 PROCEDURE — 76817 TRANSVAGINAL US OBSTETRIC: CPT

## 2019-07-15 PROCEDURE — 96361 HYDRATE IV INFUSION ADD-ON: CPT

## 2019-07-15 PROCEDURE — G0378 HOSPITAL OBSERVATION PER HR: HCPCS

## 2019-07-15 PROCEDURE — 80053 COMPREHEN METABOLIC PANEL: CPT | Performed by: OBSTETRICS & GYNECOLOGY

## 2019-07-15 PROCEDURE — 81003 URINALYSIS AUTO W/O SCOPE: CPT | Performed by: OBSTETRICS & GYNECOLOGY

## 2019-07-15 PROCEDURE — 76815 OB US LIMITED FETUS(S): CPT

## 2019-07-15 PROCEDURE — 25010000002 ONDANSETRON PER 1 MG: Performed by: OBSTETRICS & GYNECOLOGY

## 2019-07-15 PROCEDURE — 96374 THER/PROPH/DIAG INJ IV PUSH: CPT

## 2019-07-15 RX ORDER — ACETAMINOPHEN 325 MG/1
650 TABLET ORAL ONCE AS NEEDED
Status: COMPLETED | OUTPATIENT
Start: 2019-07-15 | End: 2019-07-15

## 2019-07-15 RX ORDER — ONDANSETRON 2 MG/ML
4 INJECTION INTRAMUSCULAR; INTRAVENOUS EVERY 6 HOURS PRN
Status: DISCONTINUED | OUTPATIENT
Start: 2019-07-15 | End: 2019-07-15 | Stop reason: HOSPADM

## 2019-07-15 RX ORDER — IBUPROFEN 600 MG/1
600 TABLET ORAL EVERY 4 HOURS PRN
Status: DISCONTINUED | OUTPATIENT
Start: 2019-07-15 | End: 2019-07-15 | Stop reason: HOSPADM

## 2019-07-15 RX ORDER — SODIUM CHLORIDE, SODIUM LACTATE, POTASSIUM CHLORIDE, CALCIUM CHLORIDE 600; 310; 30; 20 MG/100ML; MG/100ML; MG/100ML; MG/100ML
125 INJECTION, SOLUTION INTRAVENOUS CONTINUOUS
Status: DISCONTINUED | OUTPATIENT
Start: 2019-07-15 | End: 2019-07-15 | Stop reason: HOSPADM

## 2019-07-15 RX ADMIN — POTASSIUM CHLORIDE 40 MEQ: 750 CAPSULE, EXTENDED RELEASE ORAL at 10:39

## 2019-07-15 RX ADMIN — IBUPROFEN 600 MG: 600 TABLET, FILM COATED ORAL at 15:26

## 2019-07-15 RX ADMIN — POTASSIUM CHLORIDE 40 MEQ: 750 CAPSULE, EXTENDED RELEASE ORAL at 15:28

## 2019-07-15 RX ADMIN — ACETAMINOPHEN 650 MG: 325 TABLET, FILM COATED ORAL at 12:56

## 2019-07-15 RX ADMIN — POTASSIUM CHLORIDE 40 MEQ: 750 CAPSULE, EXTENDED RELEASE ORAL at 04:24

## 2019-07-15 RX ADMIN — ONDANSETRON 4 MG: 2 INJECTION INTRAMUSCULAR; INTRAVENOUS at 12:27

## 2019-07-15 RX ADMIN — NIFEDIPINE 10 MG: 10 CAPSULE ORAL at 01:23

## 2019-07-15 RX ADMIN — POTASSIUM CHLORIDE 40 MEQ: 750 CAPSULE, EXTENDED RELEASE ORAL at 00:02

## 2019-07-15 RX ADMIN — SODIUM CHLORIDE, POTASSIUM CHLORIDE, SODIUM LACTATE AND CALCIUM CHLORIDE 999 ML/HR: 600; 310; 30; 20 INJECTION, SOLUTION INTRAVENOUS at 01:26

## 2019-07-15 NOTE — NURSING NOTE
Pt asked about history of asthma since terbutaline has been ordered.  Pt states she hasn't had any issues with asthma since age 15 and has been through [facility] and deployed with the .  Pt states she feels safe receiving medication.

## 2019-07-15 NOTE — H&P
[facility]  Obstetric History and Physical    Patient Name: María Laguerre  :  1990  MRN:  [id number]        Chief Complaint   Patient presents with   • PREGNANCY     states has been having contractions and what feels like a leakage of fluid since yesterday.  baby is active, denies a gush of fluid, denies vaginal bleeding. upon visual examination pt is dry with no fluid noted.     • pregnancy     denies any burning with urination small amount of white discharge with no smell noted       Subjective     Patient is a 29 y.o. female  currently at 19w6d, who presented last night with  contractions.  She received several doses of terbutaline and then one dose of procardia.  She was ruled out for ROM.  This morning she feels much better, states only pressure, no UCs.  She denies any dysuria. No recent intercourse. Otherwise feels well. Her history is significant for 2 prior full term SVDs..           Her prenatal care is uncomplicated except for history of seizures.       Past Medical History: Past Medical History:   Diagnosis Date   • Abnormal Pap smear of cervix     f/u wnl   • Anxiety    • Asthma    • Cancer (CMS/HCC)     cervical    • Cervical cancer (CMS/HCC)    • Depression     taken zoloft in the past but not during this pregnancy   • PTSD (post-traumatic stress disorder)    • Seizures (CMS/HCC)     since 20years old...last was 5 months ago   • Skull fracture (CMS/HCC)     while in  in [place]   • Urinary tract infection       Past Surgical History Past Surgical History:   Procedure Laterality Date   • CERVICAL BIOPSY  W/ LOOP ELECTRODE EXCISION     • WISDOM TOOTH EXTRACTION        Family History: Family History   Family history unknown: Yes      Social History:  reports that she has quit smoking. She has never used smokeless tobacco.   reports that she does not drink alcohol.   reports that she uses drugs. Drug: Marijuana.    Allergies:     Orange fruit [citrus] and Adhesive tape      Past OB History:       Obstetric History       T2      L1     SAB0   TAB0   Ectopic0   Molar0   Multiple0   Live Births1       # Outcome Date GA Lbr Leonidas/2nd Weight Sex Delivery Anes PTL Lv   3 Current            2 Term 18 39w5d 04:15 / 00:21 2858 g (6 lb 4.8 oz) F Vag-Spont EPI N BRI      Name: GLEN ANAND      Apgar1:  8                Apgar5: 9   1 Term 01/20/10 40w0d   M Vag-Spont EPI Y             Objective       Vital Signs Range for the last 24 hours  Temperature: Temp:  [97.8 °F (36.6 °C)-98.2 °F (36.8 °C)] 98.1 °F (36.7 °C)   Temp Source: Temp src: Oral   BP: BP: ()/(44-74) 82/44   Pulse: Heart Rate:  [61-77] 61   Respirations: Resp:  [16-18] 18             Physical Exam:        General :    Alert and cooperative in NAD   Lungs:   Clear to auscultation bilaterally   CV:   Regular rate and rhythm   Abdomen:  Gravid, non-tender, no masses   Vaginal exam: Deferred this AM, fingertip last night per RN     LE:   no edema     FHR:   Doppler normal   [place]:   quiet              A/P:  19wk6d,  contractions which resolved with procardia. Feels well this AM.  Normal recent anatomy scan. Will consult [facility] for cervical length and if normal may d/c home.        Problem List Items Addressed This Visit     None                  Mary Beth Mcclure MD  7/15/2019  10:47 AM

## 2019-07-15 NOTE — PLAN OF CARE
Problem: Patient Care Overview  Goal: Plan of Care Review  Outcome: Ongoing (interventions implemented as appropriate)   07/15/19 0522   Coping/Psychosocial   Plan of Care Reviewed With patient;spouse   Plan of Care Review   Progress improving     Goal: Individualization and Mutuality  Outcome: Ongoing (interventions implemented as appropriate)   07/15/19 0052   Individualization   Patient Specific Preferences stay pregnant   Patient Specific Goals (Include Timeframe) stay pregnant at least till 30 weeks   Patient Specific Interventions Terbutaline, Procardia, IV fluids   Mutuality/Individual Preferences   What Anxieties, Fears, Concerns, or Questions Do You Have About Your Care?  delivery   How Would You and/or Your Support Person Like to Participate in Your Care? will be with patient most of the time   Mutuality/Individual Preferences   How to Address Anxieties/Fears Has PTSD, anxiety and depression, keep informed on POC       Problem:  Labor (Adult,Obstetrics,Pediatric)  Goal: Signs and Symptoms of Listed Potential Problems Will be Absent, Minimized or Managed ( Labor)  Outcome: Ongoing (interventions implemented as appropriate)   07/15/19 0522   Goal/Outcome Evaluation   Problems Assessed ( Labor) all   Problems Present ( Labor) none

## 2019-07-15 NOTE — PLAN OF CARE
Problem: Patient Care Overview  Goal: Plan of Care Review  Outcome: Ongoing (interventions implemented as appropriate)   07/15/19 0052   Coping/Psychosocial   Plan of Care Reviewed With patient;significant other   Plan of Care Review   Progress improving     Goal: Individualization and Mutuality  Outcome: Ongoing (interventions implemented as appropriate)   07/15/19 0052   Individualization   Patient Specific Preferences stay pregnant   Patient Specific Goals (Include Timeframe) stay pregnant at least till 30 weeks   Patient Specific Interventions Terbutaline, Procardia, IV fluids   Mutuality/Individual Preferences   What Anxieties, Fears, Concerns, or Questions Do You Have About Your Care?  delivery   How Would You and/or Your Support Person Like to Participate in Your Care? will be with patient most of the time   Mutuality/Individual Preferences   How to Address Anxieties/Fears Has PTSD, anxiety and depression, keep informed on POC     Goal: Discharge Needs Assessment  Outcome: Ongoing (interventions implemented as appropriate)   07/15/19 0052   Discharge Needs Assessment   Readmission Within the Last 30 Days no previous admission in last 30 days   Concerns to be Addressed no discharge needs identified   Patient/Family Anticipates Transition to home   Patient/Family Anticipated Services at Transition none   Transportation Concerns car, none   Transportation Anticipated family or friend will provide   Anticipated Changes Related to Illness none   Equipment Needed After Discharge none   Offered/Gave Vendor List no   Disability   Equipment Currently Used at Home none     Goal: Interprofessional Rounds/Family Conf  Outcome: Ongoing (interventions implemented as appropriate)   07/15/19 0052   Interdisciplinary Rounds/Family Conf   Participants family;patient;physician       Problem:  Labor (Adult,Obstetrics,Pediatric)  Goal: Signs and Symptoms of Listed Potential Problems Will be Absent, Minimized or  Managed ( Labor)  Outcome: Ongoing (interventions implemented as appropriate)   07/15/19 0052   Goal/Outcome Evaluation   Problems Assessed ( Labor) all   Problems Present ( Labor) none

## 2019-07-15 NOTE — NURSING NOTE
Nurse started to give patient a bolus of LR since that had not be tried yet to get rid of her contractions.

## 2019-07-15 NOTE — NURSING NOTE
Patient had a couple of contractions that were closer together 7-12 minutes apart. RN wanted to give the 10mg of Procardia to see if that helped get rid of the contractions. Also giving bolus of LR of 500mL to counteract possible effects on BP.

## 2019-10-01 ENCOUNTER — APPOINTMENT (OUTPATIENT)
Dept: CARDIOLOGY | Facility: HOSPITAL | Age: 29
End: 2019-10-01

## 2019-10-01 ENCOUNTER — HOSPITAL ENCOUNTER (OUTPATIENT)
Facility: HOSPITAL | Age: 29
Setting detail: OBSERVATION
Discharge: HOME | End: 2019-10-01
Attending: OBSTETRICS & GYNECOLOGY | Admitting: OBSTETRICS & GYNECOLOGY

## 2019-10-01 VITALS
SYSTOLIC BLOOD PRESSURE: 110 MMHG | RESPIRATION RATE: 18 BRPM | HEIGHT: 62 IN | WEIGHT: 132.2 LBS | DIASTOLIC BLOOD PRESSURE: 58 MMHG | BODY MASS INDEX: 24.33 KG/M2 | HEART RATE: 69 BPM | TEMPERATURE: 98.1 F | OXYGEN SATURATION: 98 %

## 2019-10-01 LAB
ALBUMIN SERPL-MCNC: 3.7 G/DL (ref 3.5–5.2)
ALBUMIN/GLOB SERPL: 1.4 G/DL
ALP SERPL-CCNC: 152 U/L (ref 39–117)
ALT SERPL W P-5'-P-CCNC: <5 U/L (ref 1–33)
ANION GAP SERPL CALCULATED.3IONS-SCNC: 11.9 MMOL/L (ref 5–15)
AST SERPL-CCNC: 11 U/L (ref 1–32)
BASOPHILS # BLD AUTO: 0.03 10*3/MM3 (ref 0–0.2)
BASOPHILS NFR BLD AUTO: 0.3 % (ref 0–1.5)
BILIRUB SERPL-MCNC: 0.2 MG/DL (ref 0.2–1.2)
BUN BLD-MCNC: 8 MG/DL (ref 6–20)
BUN/CREAT SERPL: 14.8 (ref 7–25)
CALCIUM SPEC-SCNC: 8.4 MG/DL (ref 8.6–10.5)
CHLORIDE SERPL-SCNC: 100 MMOL/L (ref 98–107)
CO2 SERPL-SCNC: 23.1 MMOL/L (ref 22–29)
CREAT BLD-MCNC: 0.54 MG/DL (ref 0.57–1)
DEPRECATED RDW RBC AUTO: 37.5 FL (ref 37–54)
EOSINOPHIL # BLD AUTO: 0.12 10*3/MM3 (ref 0–0.4)
EOSINOPHIL NFR BLD AUTO: 1.1 % (ref 0.3–6.2)
ERYTHROCYTE [DISTWIDTH] IN BLOOD BY AUTOMATED COUNT: 12.5 % (ref 12.3–15.4)
GFR SERPL CREATININE-BSD FRML MDRD: 134 ML/MIN/1.73
GLOBULIN UR ELPH-MCNC: 2.7 GM/DL
GLUCOSE BLD-MCNC: 84 MG/DL (ref 65–99)
HCT VFR BLD AUTO: 31.1 % (ref 34–46.6)
HGB BLD-MCNC: 10.5 G/DL (ref 12–15.9)
IMM GRANULOCYTES # BLD AUTO: 0.06 10*3/MM3 (ref 0–0.05)
IMM GRANULOCYTES NFR BLD AUTO: 0.6 % (ref 0–0.5)
LYMPHOCYTES # BLD AUTO: 1.14 10*3/MM3 (ref 0.7–3.1)
LYMPHOCYTES NFR BLD AUTO: 10.6 % (ref 19.6–45.3)
MCH RBC QN AUTO: 28 PG (ref 26.6–33)
MCHC RBC AUTO-ENTMCNC: 33.8 G/DL (ref 31.5–35.7)
MCV RBC AUTO: 82.9 FL (ref 79–97)
MONOCYTES # BLD AUTO: 0.65 10*3/MM3 (ref 0.1–0.9)
MONOCYTES NFR BLD AUTO: 6 % (ref 5–12)
NEUTROPHILS # BLD AUTO: 8.8 10*3/MM3 (ref 1.7–7)
NEUTROPHILS NFR BLD AUTO: 81.4 % (ref 42.7–76)
NRBC BLD AUTO-RTO: 0 /100 WBC (ref 0–0.2)
PLATELET # BLD AUTO: 216 10*3/MM3 (ref 140–450)
PMV BLD AUTO: 9.5 FL (ref 6–12)
POTASSIUM BLD-SCNC: 3.6 MMOL/L (ref 3.5–5.2)
PROT SERPL-MCNC: 6.4 G/DL (ref 6–8.5)
RBC # BLD AUTO: 3.75 10*6/MM3 (ref 3.77–5.28)
SODIUM BLD-SCNC: 135 MMOL/L (ref 136–145)
WBC NRBC COR # BLD: 10.8 10*3/MM3 (ref 3.4–10.8)

## 2019-10-01 PROCEDURE — 85025 COMPLETE CBC W/AUTO DIFF WBC: CPT | Performed by: OBSTETRICS & GYNECOLOGY

## 2019-10-01 PROCEDURE — G0378 HOSPITAL OBSERVATION PER HR: HCPCS

## 2019-10-01 PROCEDURE — 80053 COMPREHEN METABOLIC PANEL: CPT | Performed by: OBSTETRICS & GYNECOLOGY

## 2019-10-01 PROCEDURE — 96360 HYDRATION IV INFUSION INIT: CPT

## 2019-10-01 PROCEDURE — 93970 EXTREMITY STUDY: CPT

## 2019-10-01 PROCEDURE — 59025 FETAL NON-STRESS TEST: CPT

## 2019-10-01 RX ORDER — METOCLOPRAMIDE 10 MG/1
10 TABLET ORAL ONCE
Status: COMPLETED | OUTPATIENT
Start: 2019-10-01 | End: 2019-10-01

## 2019-10-01 RX ORDER — SODIUM CHLORIDE 0.9 % (FLUSH) 0.9 %
10 SYRINGE (ML) INJECTION EVERY 12 HOURS SCHEDULED
Status: DISCONTINUED | OUTPATIENT
Start: 2019-10-01 | End: 2019-10-01 | Stop reason: HOSPADM

## 2019-10-01 RX ORDER — SODIUM CHLORIDE 0.9 % (FLUSH) 0.9 %
10 SYRINGE (ML) INJECTION AS NEEDED
Status: DISCONTINUED | OUTPATIENT
Start: 2019-10-01 | End: 2019-10-01 | Stop reason: HOSPADM

## 2019-10-01 RX ORDER — SODIUM CHLORIDE, SODIUM LACTATE, POTASSIUM CHLORIDE, CALCIUM CHLORIDE 600; 310; 30; 20 MG/100ML; MG/100ML; MG/100ML; MG/100ML
125 INJECTION, SOLUTION INTRAVENOUS CONTINUOUS
Status: DISCONTINUED | OUTPATIENT
Start: 2019-10-01 | End: 2019-10-01 | Stop reason: HOSPADM

## 2019-10-01 RX ADMIN — SODIUM CHLORIDE, POTASSIUM CHLORIDE, SODIUM LACTATE AND CALCIUM CHLORIDE 125 ML/HR: 600; 310; 30; 20 INJECTION, SOLUTION INTRAVENOUS at 20:18

## 2019-10-01 RX ADMIN — METOCLOPRAMIDE 10 MG: 10 TABLET ORAL at 20:33

## 2019-10-02 LAB

## 2019-10-02 NOTE — NON STRESS TEST
María Laguerre, a  at 31w0d with an JERRY of 12/3/2019, by Ultrasound, was seen at [facility] LABOR DELIVERY for a nonstress test.    Chief Complaint   Patient presents with   • Shortness of Breath     Patient stated having headache and dizziness yesterday and a little short of air. Has been resting all day and feels short of air and has a headache with dizziness.       Patient Active Problem List   Diagnosis   • Pregnancy       Start Time:   Stop Time:

## 2019-10-02 NOTE — NURSING NOTE
Patient laying back for ultrasound. States that her shortness of air is not as bad now that her headache is gone. RN is at bedside and patient has not reported any episodes of being short of air or dizzy within last 15 minutes.

## 2019-11-09 ENCOUNTER — HOSPITAL ENCOUNTER (OUTPATIENT)
Facility: HOSPITAL | Age: 29
Setting detail: OBSERVATION
Discharge: HOME | End: 2019-11-09
Attending: OBSTETRICS & GYNECOLOGY | Admitting: OBSTETRICS & GYNECOLOGY

## 2019-11-09 VITALS
HEIGHT: 62 IN | RESPIRATION RATE: 18 BRPM | SYSTOLIC BLOOD PRESSURE: 114 MMHG | WEIGHT: 138.4 LBS | BODY MASS INDEX: 25.47 KG/M2 | DIASTOLIC BLOOD PRESSURE: 77 MMHG | HEART RATE: 65 BPM | TEMPERATURE: 98.2 F

## 2019-11-09 PROCEDURE — 59025 FETAL NON-STRESS TEST: CPT

## 2019-11-09 PROCEDURE — G0378 HOSPITAL OBSERVATION PER HR: HCPCS

## 2019-11-09 NOTE — NURSING NOTE
Spoke to Dr. Taylor, patient here for contractions since 0100. Pt reports ctx have become more painful and closer together. Pt reports pain 7-9/10 on pain scale. +FM, no VB or LOF noted. Pt is carmen 1-5 minutes and is breathing through them. Reactive fetal heart tones. MD states to watch patient for 2 hours and recheck cervix. Patient may be intermittently monitored and is allowed to walk on the unit. Will call MD back with next SVE check. Pt may be orally hydrated.

## 2019-11-09 NOTE — NON STRESS TEST
María Laguerre, a  at 37w3d with an JERRY of 2019, by Patient Reported, was seen at [facility] LABOR DELIVERY for a nonstress test.    Chief Complaint   Patient presents with   • Contractions     pt states ctx started around 0100. ctx are 5-7 minutes apart. Denies LOF or VB. Pt reports nothing has relieved pain. +FM       Patient Active Problem List   Diagnosis   • Pregnancy       Start Time: 918  Stop Time:   Intermittent monitoring so patient could ambulate on unit  Interpretation A  Nonstress Test Interpretation A: Reactive (19 1202 : Alcira Reyes RN)

## 2019-11-09 NOTE — NURSING NOTE
This RN reviewed discharge instructions with patient. Discussed s/sx of labor, contractions, vaginal bleeding, decreased fetal movement and leakage of fluid. Patient is understanding and all questions were answered. Pt encouraged to keep all upcoming OB appointments. S/sx of labor given. Pt told to come back to L&D for worsening ctx, VB, LOF, or DFM.  Patient ambulated off floor at this time.

## 2019-11-15 ENCOUNTER — ANESTHESIA EVENT (OUTPATIENT)
Dept: LABOR AND DELIVERY | Facility: HOSPITAL | Age: 29
End: 2019-11-15

## 2019-11-15 ENCOUNTER — ANESTHESIA (OUTPATIENT)
Dept: LABOR AND DELIVERY | Facility: HOSPITAL | Age: 29
End: 2019-11-15

## 2019-11-15 ENCOUNTER — HOSPITAL ENCOUNTER (INPATIENT)
Facility: HOSPITAL | Age: 29
LOS: 2 days | Discharge: HOME | End: 2019-11-17
Attending: OBSTETRICS & GYNECOLOGY | Admitting: OBSTETRICS & GYNECOLOGY

## 2019-11-15 PROBLEM — Z34.90 PREGNANCY: Status: RESOLVED | Noted: 2019-07-14 | Resolved: 2019-11-15

## 2019-11-15 LAB
ABO GROUP BLD: NORMAL
AMPHET+METHAMPHET UR QL: NEGATIVE
BARBITURATES UR QL SCN: NEGATIVE
BASOPHILS # BLD AUTO: 0.02 10*3/MM3 (ref 0–0.2)
BASOPHILS NFR BLD AUTO: 0.2 % (ref 0–1.5)
BENZODIAZ UR QL SCN: NEGATIVE
BLD GP AB SCN SERPL QL: NEGATIVE
CANNABINOIDS SERPL QL: NEGATIVE
COCAINE UR QL: NEGATIVE
DEPRECATED RDW RBC AUTO: 39 FL (ref 37–54)
EOSINOPHIL # BLD AUTO: 0.08 10*3/MM3 (ref 0–0.4)
EOSINOPHIL NFR BLD AUTO: 0.8 % (ref 0.3–6.2)
ERYTHROCYTE [DISTWIDTH] IN BLOOD BY AUTOMATED COUNT: 13.9 % (ref 12.3–15.4)
HCT VFR BLD AUTO: 32.7 % (ref 34–46.6)
HGB BLD-MCNC: 10.5 G/DL (ref 12–15.9)
IMM GRANULOCYTES # BLD AUTO: 0.05 10*3/MM3 (ref 0–0.05)
IMM GRANULOCYTES NFR BLD AUTO: 0.5 % (ref 0–0.5)
LYMPHOCYTES # BLD AUTO: 1.1 10*3/MM3 (ref 0.7–3.1)
LYMPHOCYTES NFR BLD AUTO: 10.5 % (ref 19.6–45.3)
MCH RBC QN AUTO: 25 PG (ref 26.6–33)
MCHC RBC AUTO-ENTMCNC: 32.1 G/DL (ref 31.5–35.7)
MCV RBC AUTO: 77.9 FL (ref 79–97)
METHADONE UR QL SCN: NEGATIVE
MONOCYTES # BLD AUTO: 0.69 10*3/MM3 (ref 0.1–0.9)
MONOCYTES NFR BLD AUTO: 6.6 % (ref 5–12)
NEUTROPHILS # BLD AUTO: 8.56 10*3/MM3 (ref 1.7–7)
NEUTROPHILS NFR BLD AUTO: 81.4 % (ref 42.7–76)
NRBC BLD AUTO-RTO: 0 /100 WBC (ref 0–0.2)
OPIATES UR QL: NEGATIVE
OXYCODONE UR QL SCN: NEGATIVE
PLATELET # BLD AUTO: 209 10*3/MM3 (ref 140–450)
PMV BLD AUTO: 10.1 FL (ref 6–12)
RBC # BLD AUTO: 4.2 10*6/MM3 (ref 3.77–5.28)
RH BLD: POSITIVE
T&S EXPIRATION DATE: NORMAL
WBC NRBC COR # BLD: 10.5 10*3/MM3 (ref 3.4–10.8)

## 2019-11-15 PROCEDURE — 80307 DRUG TEST PRSMV CHEM ANLYZR: CPT | Performed by: OBSTETRICS & GYNECOLOGY

## 2019-11-15 PROCEDURE — 86901 BLOOD TYPING SEROLOGIC RH(D): CPT | Performed by: OBSTETRICS & GYNECOLOGY

## 2019-11-15 PROCEDURE — C1755 CATHETER, INTRASPINAL: HCPCS | Performed by: ANESTHESIOLOGY

## 2019-11-15 PROCEDURE — 85025 COMPLETE CBC W/AUTO DIFF WBC: CPT | Performed by: OBSTETRICS & GYNECOLOGY

## 2019-11-15 PROCEDURE — 86850 RBC ANTIBODY SCREEN: CPT | Performed by: OBSTETRICS & GYNECOLOGY

## 2019-11-15 PROCEDURE — 86900 BLOOD TYPING SEROLOGIC ABO: CPT | Performed by: OBSTETRICS & GYNECOLOGY

## 2019-11-15 PROCEDURE — C1755 CATHETER, INTRASPINAL: HCPCS

## 2019-11-15 PROCEDURE — 25010000002 ONDANSETRON PER 1 MG: Performed by: OBSTETRICS & GYNECOLOGY

## 2019-11-15 RX ORDER — MISOPROSTOL 200 UG/1
600 TABLET ORAL ONCE AS NEEDED
Status: DISCONTINUED | OUTPATIENT
Start: 2019-11-15 | End: 2019-11-17 | Stop reason: HOSPADM

## 2019-11-15 RX ORDER — LANOLIN
CREAM (ML) TOPICAL
Status: DISCONTINUED | OUTPATIENT
Start: 2019-11-15 | End: 2019-11-17 | Stop reason: HOSPADM

## 2019-11-15 RX ORDER — SODIUM CHLORIDE 0.9 % (FLUSH) 0.9 %
1-10 SYRINGE (ML) INJECTION AS NEEDED
Status: DISCONTINUED | OUTPATIENT
Start: 2019-11-15 | End: 2019-11-15 | Stop reason: HOSPADM

## 2019-11-15 RX ORDER — OXYTOCIN-SODIUM CHLORIDE 0.9% IV SOLN 30 UNIT/500ML 30-0.9/5 UT/ML-%
250 SOLUTION INTRAVENOUS CONTINUOUS
Status: DISPENSED | OUTPATIENT
Start: 2019-11-15 | End: 2019-11-15

## 2019-11-15 RX ORDER — DOCUSATE SODIUM 100 MG/1
100 CAPSULE, LIQUID FILLED ORAL 2 TIMES DAILY
Status: DISCONTINUED | OUTPATIENT
Start: 2019-11-16 | End: 2019-11-17 | Stop reason: HOSPADM

## 2019-11-15 RX ORDER — ONDANSETRON 4 MG/1
4 TABLET, FILM COATED ORAL EVERY 6 HOURS PRN
Status: DISCONTINUED | OUTPATIENT
Start: 2019-11-15 | End: 2019-11-15 | Stop reason: HOSPADM

## 2019-11-15 RX ORDER — ONDANSETRON 4 MG/1
4 TABLET, FILM COATED ORAL EVERY 8 HOURS PRN
Status: DISCONTINUED | OUTPATIENT
Start: 2019-11-15 | End: 2019-11-17 | Stop reason: HOSPADM

## 2019-11-15 RX ORDER — MISOPROSTOL 200 UG/1
800 TABLET ORAL AS NEEDED
Status: DISCONTINUED | OUTPATIENT
Start: 2019-11-15 | End: 2019-11-15 | Stop reason: HOSPADM

## 2019-11-15 RX ORDER — PROMETHAZINE HYDROCHLORIDE 25 MG/1
25 TABLET ORAL EVERY 6 HOURS PRN
Status: DISCONTINUED | OUTPATIENT
Start: 2019-11-15 | End: 2019-11-17 | Stop reason: HOSPADM

## 2019-11-15 RX ORDER — METHYLERGONOVINE MALEATE 0.2 MG/ML
200 INJECTION INTRAVENOUS ONCE AS NEEDED
Status: DISCONTINUED | OUTPATIENT
Start: 2019-11-15 | End: 2019-11-15 | Stop reason: HOSPADM

## 2019-11-15 RX ORDER — SODIUM CHLORIDE 0.9 % (FLUSH) 0.9 %
3 SYRINGE (ML) INJECTION EVERY 12 HOURS SCHEDULED
Status: DISCONTINUED | OUTPATIENT
Start: 2019-11-15 | End: 2019-11-15 | Stop reason: HOSPADM

## 2019-11-15 RX ORDER — PROMETHAZINE HYDROCHLORIDE 25 MG/ML
12.5 INJECTION, SOLUTION INTRAMUSCULAR; INTRAVENOUS EVERY 6 HOURS PRN
Status: DISCONTINUED | OUTPATIENT
Start: 2019-11-15 | End: 2019-11-17 | Stop reason: HOSPADM

## 2019-11-15 RX ORDER — LIDOCAINE HYDROCHLORIDE 10 MG/ML
5 INJECTION, SOLUTION EPIDURAL; INFILTRATION; INTRACAUDAL; PERINEURAL AS NEEDED
Status: DISCONTINUED | OUTPATIENT
Start: 2019-11-15 | End: 2019-11-15 | Stop reason: HOSPADM

## 2019-11-15 RX ORDER — OXYCODONE HYDROCHLORIDE AND ACETAMINOPHEN 5; 325 MG/1; MG/1
1 TABLET ORAL EVERY 4 HOURS PRN
Status: DISCONTINUED | OUTPATIENT
Start: 2019-11-15 | End: 2019-11-17 | Stop reason: HOSPADM

## 2019-11-15 RX ORDER — LIDOCAINE HYDROCHLORIDE AND EPINEPHRINE 15; 5 MG/ML; UG/ML
INJECTION, SOLUTION EPIDURAL AS NEEDED
Status: DISCONTINUED | OUTPATIENT
Start: 2019-11-15 | End: 2019-11-15 | Stop reason: SURG

## 2019-11-15 RX ORDER — EPHEDRINE SULFATE 50 MG/ML
10 INJECTION, SOLUTION INTRAVENOUS
Status: DISCONTINUED | OUTPATIENT
Start: 2019-11-15 | End: 2019-11-15 | Stop reason: HOSPADM

## 2019-11-15 RX ORDER — OXYTOCIN-SODIUM CHLORIDE 0.9% IV SOLN 30 UNIT/500ML 30-0.9/5 UT/ML-%
999 SOLUTION INTRAVENOUS ONCE
Status: DISCONTINUED | OUTPATIENT
Start: 2019-11-15 | End: 2019-11-15 | Stop reason: HOSPADM

## 2019-11-15 RX ORDER — CARBOPROST TROMETHAMINE 250 UG/ML
250 INJECTION, SOLUTION INTRAMUSCULAR AS NEEDED
Status: DISCONTINUED | OUTPATIENT
Start: 2019-11-15 | End: 2019-11-15 | Stop reason: HOSPADM

## 2019-11-15 RX ORDER — HYDROXYZINE 50 MG/1
50 TABLET, FILM COATED ORAL NIGHTLY PRN
Status: DISCONTINUED | OUTPATIENT
Start: 2019-11-15 | End: 2019-11-17 | Stop reason: HOSPADM

## 2019-11-15 RX ORDER — ONDANSETRON 2 MG/ML
4 INJECTION INTRAMUSCULAR; INTRAVENOUS EVERY 6 HOURS PRN
Status: DISCONTINUED | OUTPATIENT
Start: 2019-11-15 | End: 2019-11-15 | Stop reason: HOSPADM

## 2019-11-15 RX ORDER — OXYTOCIN-SODIUM CHLORIDE 0.9% IV SOLN 30 UNIT/500ML 30-0.9/5 UT/ML-%
125 SOLUTION INTRAVENOUS CONTINUOUS PRN
Status: COMPLETED | OUTPATIENT
Start: 2019-11-15 | End: 2019-11-15

## 2019-11-15 RX ORDER — SODIUM CHLORIDE, SODIUM LACTATE, POTASSIUM CHLORIDE, CALCIUM CHLORIDE 600; 310; 30; 20 MG/100ML; MG/100ML; MG/100ML; MG/100ML
125 INJECTION, SOLUTION INTRAVENOUS CONTINUOUS
Status: DISCONTINUED | OUTPATIENT
Start: 2019-11-15 | End: 2019-11-17 | Stop reason: HOSPADM

## 2019-11-15 RX ORDER — IBUPROFEN 800 MG/1
800 TABLET ORAL EVERY 8 HOURS PRN
Status: DISCONTINUED | OUTPATIENT
Start: 2019-11-15 | End: 2019-11-17 | Stop reason: HOSPADM

## 2019-11-15 RX ORDER — PROMETHAZINE HYDROCHLORIDE 25 MG/1
12.5 SUPPOSITORY RECTAL EVERY 6 HOURS PRN
Status: DISCONTINUED | OUTPATIENT
Start: 2019-11-15 | End: 2019-11-17 | Stop reason: HOSPADM

## 2019-11-15 RX ORDER — OXYTOCIN-SODIUM CHLORIDE 0.9% IV SOLN 30 UNIT/500ML 30-0.9/5 UT/ML-%
2-30 SOLUTION INTRAVENOUS
Status: DISCONTINUED | OUTPATIENT
Start: 2019-11-15 | End: 2019-11-15 | Stop reason: HOSPADM

## 2019-11-15 RX ORDER — ONDANSETRON 2 MG/ML
4 INJECTION INTRAMUSCULAR; INTRAVENOUS EVERY 6 HOURS PRN
Status: DISCONTINUED | OUTPATIENT
Start: 2019-11-15 | End: 2019-11-17 | Stop reason: HOSPADM

## 2019-11-15 RX ORDER — BUPIVACAINE HYDROCHLORIDE 5 MG/ML
INJECTION, SOLUTION EPIDURAL; INTRACAUDAL AS NEEDED
Status: DISCONTINUED | OUTPATIENT
Start: 2019-11-15 | End: 2019-11-15 | Stop reason: SURG

## 2019-11-15 RX ORDER — CALCIUM CARBONATE 200(500)MG
2 TABLET,CHEWABLE ORAL 3 TIMES DAILY PRN
Status: DISCONTINUED | OUTPATIENT
Start: 2019-11-15 | End: 2019-11-17 | Stop reason: HOSPADM

## 2019-11-15 RX ORDER — OXYCODONE AND ACETAMINOPHEN 10; 325 MG/1; MG/1
1 TABLET ORAL EVERY 4 HOURS PRN
Status: DISCONTINUED | OUTPATIENT
Start: 2019-11-15 | End: 2019-11-17 | Stop reason: HOSPADM

## 2019-11-15 RX ADMIN — Medication: at 23:31

## 2019-11-15 RX ADMIN — OXYTOCIN 125 ML/HR: 10 INJECTION INTRAVENOUS at 21:12

## 2019-11-15 RX ADMIN — SODIUM CHLORIDE, POTASSIUM CHLORIDE, SODIUM LACTATE AND CALCIUM CHLORIDE 1000 ML: 600; 310; 30; 20 INJECTION, SOLUTION INTRAVENOUS at 11:53

## 2019-11-15 RX ADMIN — Medication 8 ML/HR: at 13:50

## 2019-11-15 RX ADMIN — LIDOCAINE HYDROCHLORIDE AND EPINEPHRINE 5 ML: 15; 5 INJECTION, SOLUTION EPIDURAL at 13:32

## 2019-11-15 RX ADMIN — SODIUM CHLORIDE, POTASSIUM CHLORIDE, SODIUM LACTATE AND CALCIUM CHLORIDE 125 ML/HR: 600; 310; 30; 20 INJECTION, SOLUTION INTRAVENOUS at 12:58

## 2019-11-15 RX ADMIN — BUPIVACAINE HYDROCHLORIDE 10 ML: 5 INJECTION, SOLUTION EPIDURAL; INTRACAUDAL; PERINEURAL at 16:47

## 2019-11-15 RX ADMIN — IBUPROFEN 800 MG: 800 TABLET, FILM COATED ORAL at 23:31

## 2019-11-15 RX ADMIN — OXYTOCIN 2 MILLI-UNITS/MIN: 10 INJECTION INTRAVENOUS at 15:24

## 2019-11-15 RX ADMIN — ONDANSETRON 4 MG: 2 INJECTION INTRAMUSCULAR; INTRAVENOUS at 17:43

## 2019-11-15 NOTE — ANESTHESIA PROCEDURE NOTES
Labor Epidural      Patient reassessed immediately prior to procedure    Patient location during procedure: OB  Start Time: 11/15/2019 1:26 PM  Stop Time: 11/15/2019 1:50 PM  Performed By  Anesthesiologist: Neeraj Becker MD  Preanesthetic Checklist  Completed: patient identified, site marked, surgical consent, pre-op evaluation, timeout performed, IV checked, risks and benefits discussed and monitors and equipment checked  Prep:  Pt Position:sitting  Sterile Tech:cap, gloves, mask and sterile barrier  Prep:povidone-iodine 7.5% surgical scrub  Monitoring:blood pressure monitoring, continuous pulse oximetry and EKG  Epidural Block Procedure:  Approach:midline  Guidance:landmark technique and palpation technique  Location:L5-S1  Needle Type:Tuohy  Needle Gauge:17  Loss of Resistance Medium: air  Paresthesia: none  Aspiration:negative  Test Dose:negative  Number of Attempts: 2  Post Assessment:  Dressing:occlusive dressing applied and secured with tape  Pt Tolerance:patient tolerated the procedure well with no apparent complications  Complications:no

## 2019-11-15 NOTE — ANESTHESIA PREPROCEDURE EVALUATION
Anesthesia Evaluation     Patient summary reviewed and Nursing notes reviewed   NPO Solid Status: > 8 hours  NPO Liquid Status: > 6 hours           Airway   Mallampati: II  Neck ROM: full  No difficulty expected  Dental - normal exam     Pulmonary     breath sounds clear to auscultation  (+) a smoker Former, asthma,  Cardiovascular     Rhythm: regular        Neuro/Psych  (+) seizures, psychiatric history (PTSD) Anxiety and Depression,       ROS Comment: Skull fracture in [place]  GI/Hepatic/Renal/Endo      Musculoskeletal     Abdominal    Substance History      OB/GYN    (+) Pregnant (38 wks, one day),         Other      history of cancer                  Anesthesia Plan    ASA 3     epidural       Anesthetic plan, all risks, benefits, and alternatives have been provided, discussed and informed consent has been obtained with: patient.

## 2019-11-15 NOTE — PLAN OF CARE
Problem: Labor (Cervical Ripen, Induct, Augment) (Adult,Obstetrics,Pediatric)  Goal: Signs and Symptoms of Listed Potential Problems Will be Absent, Minimized or Managed (Labor)  Outcome: Ongoing (interventions implemented as appropriate)   11/15/19 1728   Goal/Outcome Evaluation   Problems Assessed (Labor) all   Problems Present (Labor) none       Problem: Fall Risk,  (Adult,Obstetrics,Pediatric)  Goal: Identify Related Risk Factors and Signs and Symptoms  Outcome: Ongoing (interventions implemented as appropriate)   11/15/19 172   Fall Risk,  (Adult,Obstetrics,Pediatric)   Related Risk Factors (Fall Risk, ) regional anesthesia   Signs and Symptoms (Fall Risk, ) presence of fall risk factors     Goal: Absence of Maternal Fall  Outcome: Ongoing (interventions implemented as appropriate)   11/15/19 1728   Fall Risk,  (Adult,Obstetrics,Pediatric)   Absence of Maternal Fall making progress toward outcome     Goal: Absence of [place] Fall/Drop  Outcome: Ongoing (interventions implemented as appropriate)   11/15/19 1728   Fall Risk,  (Adult,Obstetrics,Pediatric)   Absence of [place] Fall/Drop making progress toward outcome       Problem: Skin Injury Risk (Adult)  Goal: Identify Related Risk Factors and Signs and Symptoms  Outcome: Ongoing (interventions implemented as appropriate)   11/15/19 172   Skin Injury Risk (Adult)   Related Risk Factors (Skin Injury Risk) medication;mobility impaired     Goal: Skin Health and Integrity  Outcome: Ongoing (interventions implemented as appropriate)   11/15/19 1728   Skin Injury Risk (Adult)   Skin Health and Integrity making progress toward outcome       Problem: Anesthesia/Analgesia, Neuraxial (Obstetrics)  Goal: Signs and Symptoms of Listed Potential Problems Will be Absent, Minimized or Managed (Anesthesia/Analgesia, Neuraxial)  Outcome: Ongoing (interventions implemented as appropriate)   11/15/19 172   Goal/Outcome  Evaluation   Problems Assessed (Neuraxial Anesthesia/Analgesia, OB) all   Problems Present (Neuraxial Anesth OB) none       Problem: Breastfeeding (Adult,Obstetrics,Pediatric)  Goal: Signs and Symptoms of Listed Potential Problems Will be Absent, Minimized or Managed (Breastfeeding)  Outcome: Ongoing (interventions implemented as appropriate)   11/15/19 3608   Goal/Outcome Evaluation   Problems Assessed (Breastfeeding) all   Problems Present (Breastfeeding) none

## 2019-11-16 PROBLEM — O99.013 ANEMIA OF PREGNANCY IN THIRD TRIMESTER: Status: ACTIVE | Noted: 2019-11-16

## 2019-11-16 LAB
BASOPHILS # BLD AUTO: 0.03 10*3/MM3 (ref 0–0.2)
BASOPHILS NFR BLD AUTO: 0.3 % (ref 0–1.5)
DEPRECATED RDW RBC AUTO: 38.7 FL (ref 37–54)
EOSINOPHIL # BLD AUTO: 0.08 10*3/MM3 (ref 0–0.4)
EOSINOPHIL NFR BLD AUTO: 0.8 % (ref 0.3–6.2)
ERYTHROCYTE [DISTWIDTH] IN BLOOD BY AUTOMATED COUNT: 14.1 % (ref 12.3–15.4)
HCT VFR BLD AUTO: 29.5 % (ref 34–46.6)
HGB BLD-MCNC: 9.5 G/DL (ref 12–15.9)
IMM GRANULOCYTES # BLD AUTO: 0.06 10*3/MM3 (ref 0–0.05)
IMM GRANULOCYTES NFR BLD AUTO: 0.6 % (ref 0–0.5)
LYMPHOCYTES # BLD AUTO: 1.07 10*3/MM3 (ref 0.7–3.1)
LYMPHOCYTES NFR BLD AUTO: 10.3 % (ref 19.6–45.3)
MCH RBC QN AUTO: 24.7 PG (ref 26.6–33)
MCHC RBC AUTO-ENTMCNC: 32.2 G/DL (ref 31.5–35.7)
MCV RBC AUTO: 76.6 FL (ref 79–97)
MONOCYTES # BLD AUTO: 0.73 10*3/MM3 (ref 0.1–0.9)
MONOCYTES NFR BLD AUTO: 7 % (ref 5–12)
NEUTROPHILS # BLD AUTO: 8.4 10*3/MM3 (ref 1.7–7)
NEUTROPHILS NFR BLD AUTO: 81 % (ref 42.7–76)
NRBC BLD AUTO-RTO: 0 /100 WBC (ref 0–0.2)
PLATELET # BLD AUTO: 184 10*3/MM3 (ref 140–450)
PMV BLD AUTO: 10.3 FL (ref 6–12)
RBC # BLD AUTO: 3.85 10*6/MM3 (ref 3.77–5.28)
WBC NRBC COR # BLD: 10.37 10*3/MM3 (ref 3.4–10.8)

## 2019-11-16 PROCEDURE — 85025 COMPLETE CBC W/AUTO DIFF WBC: CPT | Performed by: OBSTETRICS & GYNECOLOGY

## 2019-11-16 RX ADMIN — OXYCODONE HYDROCHLORIDE AND ACETAMINOPHEN 1 TABLET: 10; 325 TABLET ORAL at 16:13

## 2019-11-16 RX ADMIN — DOCUSATE SODIUM 100 MG: 100 CAPSULE, LIQUID FILLED ORAL at 10:17

## 2019-11-16 RX ADMIN — DOCUSATE SODIUM 100 MG: 100 CAPSULE, LIQUID FILLED ORAL at 22:17

## 2019-11-16 RX ADMIN — IBUPROFEN 800 MG: 800 TABLET, FILM COATED ORAL at 10:17

## 2019-11-16 RX ADMIN — IBUPROFEN 800 MG: 800 TABLET, FILM COATED ORAL at 22:21

## 2019-11-16 RX ADMIN — OXYCODONE HYDROCHLORIDE AND ACETAMINOPHEN 1 TABLET: 10; 325 TABLET ORAL at 06:19

## 2019-11-16 RX ADMIN — OXYCODONE HYDROCHLORIDE AND ACETAMINOPHEN 1 TABLET: 10; 325 TABLET ORAL at 10:38

## 2019-11-16 RX ADMIN — ONDANSETRON HYDROCHLORIDE 4 MG: 4 TABLET, FILM COATED ORAL at 02:03

## 2019-11-16 RX ADMIN — OXYCODONE HYDROCHLORIDE AND ACETAMINOPHEN 1 TABLET: 10; 325 TABLET ORAL at 00:28

## 2019-11-16 RX ADMIN — OXYCODONE HYDROCHLORIDE AND ACETAMINOPHEN 1 TABLET: 10; 325 TABLET ORAL at 22:17

## 2019-11-16 NOTE — L&D DELIVERY NOTE
"[facility]  Vaginal Delivery Note    Patient Name: María Laguerre  :  1990  MRN:  [id number]      Delivery     Delivery: Vaginal, Spontaneous     YOB: 2018    Time of Birth: 7:50 PM      Anesthesia: Epidural     Delivering clinician: Rena Cevallos MD       Infant    Findings: Viable male  infant    Infant observations: Weight: 3234 g (7 lb 2.1 oz)   Observations/Comments:  scale #2      Apgars: 8   @ 1 minute /    9   @ 5 minutes     Placenta, Cord, and Fluid    Placenta delivered  Spontaneous   at  11/15/2019  7:54 PM     Cord: 3 vessels  present.   Cord blood obtained: Yes    Cord gases obtained:  Yes      Repair    Episiotomy: No   Lacerations: Small left labial, not repaired   Estimated Blood Loss: 300  mls.       Delivery narrative: The patient is a 29 y.o.  at 38w2d.  Presented with ROM.  Pitocin augmented, epidural given. Progressed normally to C/C/+2.  Fetal status reassuring throughout.  of viable male infant  @ 7:50 PM  over an intact perineum. Nuchal cord reduced. ASDE. 3234 g (7 lb 2.1 oz) .  Placenta delivered spontaneously, intact with 3 vessel cord. Cervix and rectum intact.  Mother and baby recovering good condition.       Rena Cevallos MD  11/15/19  11:24 PM  Note written at time of delivery but will \"refresh\" later to include fetal data.    "

## 2019-11-16 NOTE — ANESTHESIA POSTPROCEDURE EVALUATION
Patient: María Laguerre    Procedure Summary     Date:  11/15/19 Room / Location:      Anesthesia Start:  1326 Anesthesia Stop:  1950    Procedure:  LABOR ANALGESIA Diagnosis:      Scheduled Providers:   Provider:  Praveen Short MD    Anesthesia Type:  epidural ASA Status:  3          Anesthesia Type: epidural  Last vitals  BP   115/70 (11/15/19 2015)   Temp   36.7 °C (98 °F) (11/15/19 2000)   Pulse   69 (11/15/19 2015)   Resp   16 (11/15/19 2015)     SpO2   99 % (11/15/19 1601)     Post Anesthesia Care and Evaluation    Anesthetic complications: No anesthetic complications

## 2019-11-16 NOTE — LACTATION NOTE
P3T, mom states bf is going very well so far. She has a personal pump at home. Will call if needing assistance.

## 2019-11-16 NOTE — PROGRESS NOTES
[facility]  Vaginal Delivery Progress Note    Patient Name: María Laguerre  :  1990  MRN:  [id number]      Subjective   Postpartum Day 1: Vaginal Delivery of a male infant.     The patient feels well without complaints.  Her pain is well controlled.  Reports normal lochia.     The patient plans to breastfeed.    Objective     Vital Signs Range for the last 24 hours  Temperature: Temp:  [97.2 °F (36.2 °C)-98.7 °F (37.1 °C)] 98 °F (36.7 °C)       BP: BP: ()/(50-79) 117/71   Pulse: Heart Rate:  [48-87] 73   Respirations: Resp:  [16-18] 16                       Physical Exam:  General: Awake and alert  Abdomen: Fundus: firm, non tender, 2 FB below umbilicus  Extremities:  trace edema, NT     Labs:     Results from last 7 days   Lab Units 19  0538 11/15/19  1150   WBC 10*3/mm3 10.37 10.50   HEMOGLOBIN g/dL 9.5* 10.5*   HEMATOCRIT % 29.5* 32.7*   PLATELETS 10*3/mm3 184 209       Prenatal labs results reviewed:  Yes   Rubella:  Immune  Rh Status:    RH type   Date Value Ref Range Status   11/15/2019 Positive  Final         Assessment/Plan  : 1. PPD1 S/P  - Doing well, continue usual cares. Desires circumcision for her male infant. D/w.    2. Anemia - had anemia on admission and hgb only dropped 1 point. No symptoms. Po iron on d/c.           Anemia of pregnancy in third trimester          Dawson Phillips MD  2019  7:50 AM

## 2019-11-16 NOTE — PROGRESS NOTES
"Discharge Planning Assessment  [facility]     Patient Name: María Laguerre  MRN: [id number]  Today's Date: 2019    Admit Date: 11/15/2019    Discharge Needs Assessment    No documentation.       Discharge Plan     Row Name 19 1249       Plan    Plan  Infant to discharge home with mother, CSW will follow for cord toxicology results. JESSIE CarW    Plan Comments  Mother: María Laguerre, MRN [id number]; Infant\" MaríasBoy \"Ezio\" Shade, MRN [id number]. CSW was consulted for \"cord sent.\" And \"Potential  Drug Exposure.\" CSW Kaylene spoke with Caroline at CPS hotline on 11/15/19 who verified mother does not have an active CPS case. CSW spoke with RN Yoli Rowley, who had no concerns. CSW met with mother at bedside. Father of infant, Adalberto, as well as 2 female visitors were present in the room. Mother declined to speak privately with CSW. Mother verified address and phone. Mother verified insurance, but added that she is not Medicaid pending and has not applied for Medicaid. Father reports he is aware of what needs to be done to add infant to insurance coverage. Mother reports she lives with father of infant Adalberto, and her other children. One of the children, a 20 month old daughter was also present in the room during discussion. Mother reports they have car seat, crib, bassinette, clothes, and other infant supplies. Mother is breast feeding and is not current with WIC. CSW provided mother info on local WIC sites and discussed WIC briefly with mother. HANDS program discussed with parents and info provided, mother declined HANDS referral at this time. Mother reports a good support system in father, paternal family members, friends, best friend. Mother verified her prenatal care with Dr. Cevallos and plans on infant follow up with Dr. Msacorro. Mother reports she is comfortable scheduling appointments and will have transportation to appointments. Of note, mother's urine toxicology was negative on " "admission. No prenatal urine toxicology for this pregnancy available in [facility]. No urine toxicology was obtained on infant, cord toxicology was sent. CSW will follow for cord toxicology results and will report to CPS if warranted. Family had no additional questions. Parents interacted appropriately with CSW and each other during discussion. Zaina Henriquez, CSW        Destination      No service coordination in this encounter.      Durable Medical Equipment      No service coordination in this encounter.      Dialysis/Infusion      No service coordination in this encounter.      Home Medical Care      No service coordination in this encounter.      Therapy      No service coordination in this encounter.      Community Resources      No service coordination in this encounter.          Demographic Summary     Row Name 19 1248       General Information    Admission Type  inpatient    Arrived From  home    Referral Source  nursing    Reason for Consult  substance use concerns;psychosocial concerns    General Information Comments  cord sent.\" And \"Potential  Drug Exposure        Functional Status     Row Name 19 1248       Mental Status    General Appearance WDL  WDL       Mental Status Summary    Recent Changes in Mental Status/Cognitive Functioning  unable to assess       Employment/    Employment Status  employed full time    Current or Previous Occupation  service industry    Employment/ Comments  [employer]        Psychosocial     Row Name 19 1248       Behavior WDL    Behavior WDL  WDL       Emotion Mood WDL    Emotion/Mood/Affect WDL  WDL       Speech WDL    Speech WDL  WDL       Perceptual State WDL    Perceptual State WDL  WDL       Thought Process WDL    Thought Process WDL  WDL       Intellectual Performance WDL    Intellectual Performance WDL  WDL       Coping/Stress    Major Change/Loss/Stressor  birth    Patient Personal Strengths  able to " adapt;flexibility;future/goal oriented;motivated;positive attitude;resilient;self-reliant;strong support system    Sources of Support  significant other;other family members;friend(s)        Abuse/Neglect     Row Name 11/16/19 1249       Personal Safety    Physical Signs of Abuse Present  no        Legal    No documentation.       Substance Abuse    No documentation.       Patient Forms    No documentation.           MARIANA Car

## 2019-11-16 NOTE — PLAN OF CARE
Problem: Patient Care Overview  Goal: Plan of Care Review  Outcome: Ongoing (interventions implemented as appropriate)   19 3650   Coping/Psychosocial   Plan of Care Reviewed With patient   Plan of Care Review   Progress improving   OTHER   Outcome Summary VS wdl, assessment. up with assistance, voided well,oral pain meds controlling pain     Goal: Discharge Needs Assessment  Outcome: Ongoing (interventions implemented as appropriate)      Problem: Labor (Cervical Ripen, Induct, Augment) (Adult,Obstetrics,Pediatric)  Goal: Signs and Symptoms of Listed Potential Problems Will be Absent, Minimized or Managed (Labor)  Outcome: Ongoing (interventions implemented as appropriate)      Problem: Fall Risk,  (Adult,Obstetrics,Pediatric)  Goal: Identify Related Risk Factors and Signs and Symptoms  Outcome: Ongoing (interventions implemented as appropriate)    Goal: Absence of Maternal Fall  Outcome: Ongoing (interventions implemented as appropriate)    Goal: Absence of [place] Fall/Drop  Outcome: Ongoing (interventions implemented as appropriate)      Problem: Skin Injury Risk (Adult)  Goal: Identify Related Risk Factors and Signs and Symptoms  Outcome: Ongoing (interventions implemented as appropriate)    Goal: Skin Health and Integrity  Outcome: Ongoing (interventions implemented as appropriate)      Problem: Anesthesia/Analgesia, Neuraxial (Obstetrics)  Goal: Signs and Symptoms of Listed Potential Problems Will be Absent, Minimized or Managed (Anesthesia/Analgesia, Neuraxial)  Outcome: Ongoing (interventions implemented as appropriate)      Problem: Breastfeeding (Adult,Obstetrics,Pediatric)  Goal: Signs and Symptoms of Listed Potential Problems Will be Absent, Minimized or Managed (Breastfeeding)  Outcome: Ongoing (interventions implemented as appropriate)

## 2019-11-17 VITALS
RESPIRATION RATE: 18 BRPM | HEART RATE: 51 BPM | BODY MASS INDEX: 25.4 KG/M2 | HEIGHT: 62 IN | WEIGHT: 138 LBS | OXYGEN SATURATION: 99 % | TEMPERATURE: 97.6 F | SYSTOLIC BLOOD PRESSURE: 107 MMHG | DIASTOLIC BLOOD PRESSURE: 68 MMHG

## 2019-11-17 PROBLEM — Z34.90 PREGNANCY: Status: ACTIVE | Noted: 2019-11-17

## 2019-11-17 PROBLEM — Z34.90 PREGNANCY: Status: RESOLVED | Noted: 2019-11-17 | Resolved: 2019-11-17

## 2019-11-17 PROCEDURE — 90791 PSYCH DIAGNOSTIC EVALUATION: CPT | Performed by: SOCIAL WORKER

## 2019-11-17 RX ORDER — OXYCODONE HYDROCHLORIDE AND ACETAMINOPHEN 5; 325 MG/1; MG/1
1 TABLET ORAL EVERY 4 HOURS PRN
Qty: 30 TABLET | Refills: 0 | Status: SHIPPED | OUTPATIENT
Start: 2019-11-17 | End: 2019-11-25

## 2019-11-17 RX ORDER — IBUPROFEN 800 MG/1
800 TABLET ORAL EVERY 8 HOURS PRN
Qty: 50 TABLET | Refills: 3 | Status: SHIPPED | OUTPATIENT
Start: 2019-11-17

## 2019-11-17 RX ADMIN — OXYCODONE HYDROCHLORIDE AND ACETAMINOPHEN 1 TABLET: 10; 325 TABLET ORAL at 09:32

## 2019-11-17 RX ADMIN — DOCUSATE SODIUM 100 MG: 100 CAPSULE, LIQUID FILLED ORAL at 09:32

## 2019-11-17 RX ADMIN — OXYCODONE HYDROCHLORIDE AND ACETAMINOPHEN 1 TABLET: 10; 325 TABLET ORAL at 04:41

## 2019-11-17 RX ADMIN — IBUPROFEN 800 MG: 800 TABLET, FILM COATED ORAL at 09:32

## 2019-11-17 NOTE — DISCHARGE SUMMARY
Date of Discharge:  2019    Discharge Diagnosis: term pregnancy    Presenting Problem/History of Present Illness  Pregnancy [Z34.90]  Pregnancy [Z34.90]       Hospital Course  Patient is a 29 y.o. female presented with active labor and had vaginal delivery, recovered well.      Procedures Performed  Vaginal delivery       Consults:   Consults     No orders found from 10/17/2019 to 2019.          Condition on Discharge:   Subjective   Postpartum Day 2 Vaginal Delivery.    The patient feels well without complaints.    Vital Signs  Temp:  [97.6 °F (36.4 °C)-98.7 °F (37.1 °C)] 97.6 °F (36.4 °C)  Heart Rate:  [51-73] 51  Resp:  [18] 18  BP: (101-113)/(49-68) 107/68    Physical Exam:   General: Awake and alert   Abdomen: Fundus: firm, non tender    Extremities:  Calves NT bilaterally    Assessment/Plan     PPD2  S/P  -   Stable for discharge. Instructions reviewed      Discharge Disposition  Home or Self Carehome    Discharge Medications     Discharge Medications      New Medications      Instructions Start Date   ibuprofen 800 MG tablet  Commonly known as:  ADVIL,MOTRIN   800 mg, Oral, Every 8 Hours PRN      oxyCODONE-acetaminophen 5-325 MG per tablet  Commonly known as:  PERCOCET   1 tablet, Oral, Every 4 Hours PRN         ASK your doctor about these medications      Instructions Start Date   cyclobenzaprine 10 MG tablet  Commonly known as:  FLEXERIL   10 mg, Oral, 3 Times Daily PRN      folic acid 400 MCG tablet  Commonly known as:  FOLVITE   400 mcg, Oral, Daily      ONE-A-DAY WOMENS PO   2 tablets, Oral, Daily               The patient has been prescribed a controlled substance.  She has been counseled on the risks associated with using the medication.   The addictive potential of this medication and alternatives were discussed carefully with this patient and she demonstrated understanding.  A GALLO report has been obtained and reviewed.       Activity at Discharge: restrictions  reviewed    Follow-up Appointments  6 weeks      Test Results Pending at Discharge  none     Rena Cevallos MD  11/17/19  9:48 AM

## 2019-11-17 NOTE — CONSULTS
"[facility] evaluated pt. FOB( and pt's SO) was in room with pt's permission. Pt states she recently started with a new therapist that she likes at the [facility] and has her next appt on Dec 6. Pt states she has a hx of anxiety, depression and PTSD. Pt states she served 3 1/2 yrs in [employer] and left after a head injury. Pt states she also had some post traumatic seizures at the time. Pt states [facility] while [facility] feeding. Pt and SO could not remember the name of the drug but states it is for mood issues. Pt rates her current anxiety as a \"5\" but states it can go to a \"10 quickly.\" Pt states she sometimes wakes up with a \"10\" level of anxiety. Pt states she worries about things that she knows are not likely to happen. Pt states she goes in the shower and cries which gives her relief. Pt states crying provides emotional relief for her.     Pt states she was in [facility] for 3 weeks at age 17 after a suicide attempt. Pt states she has had no other attempts. Pt states she sometimes feels she would rather not be alive but has never had intent or a plan other than when she was 17. Pt [facility] she ever felt the SI progressed to a plan or intent. Pt states her children need her and she would never hurt herself. Pt rates her depression currently as \"0\" but states her depressed mood goes up some in the evening to a \"6 or 7.\" Pt states she has good support from her SO, best friend, an aunt and SO's parents. Pt states she drinks alcohol and does some pot- both socially but denies doing any since pregnant nor plans to while breast feeding.     Pt states she has been dx with \"insomnia\" and has nightmares. Pt states her appetite is poor but she and SO have made a concerted effort to make sure she eats regularly whether she feels hungry or not. Pt states she lives in a house with her SO of 2 1/2 yrs, her 9 yr old son and " their 20 mos old daughter.     Pt states she served with the [employer] for 3 1/2 yrs in [place]. Pt plans to stay home with children for a while and then go back to college. SO works full time and has a part time job.     Pt was not in need of resources as [facility].

## 2019-11-20 NOTE — PROGRESS NOTES
Continued Stay Note  [facility]     Patient Name: María Laguerre  MRN: [id number]  Today's Date: 11/20/2019    Admit Date: 11/15/2019    Discharge Plan     Row Name 11/20/19 1042       Plan    Plan Comments  Mother: María Laguerre, MRN [id number]; Infant: Ezio Andino, MRN [id number]. CSW reviewed cord toxicology results, which are negative. Referral to CPS is not warranted at this time. No other needs. ELIANA Car        Discharge Codes    No documentation.       Expected Discharge Date and Time     Expected Discharge Date Expected Discharge Time    Nov 17, 2019             MARIANA Car

## 2020-10-22 ENCOUNTER — HOSPITAL ENCOUNTER (EMERGENCY)
Facility: HOSPITAL | Age: 30
Discharge: HOME | End: 2020-10-23
Attending: EMERGENCY MEDICINE | Admitting: EMERGENCY MEDICINE

## 2020-10-22 VITALS
DIASTOLIC BLOOD PRESSURE: 97 MMHG | BODY MASS INDEX: 22.08 KG/M2 | OXYGEN SATURATION: 97 % | TEMPERATURE: 98.7 F | WEIGHT: 120 LBS | RESPIRATION RATE: 18 BRPM | HEIGHT: 62 IN | HEART RATE: 102 BPM | SYSTOLIC BLOOD PRESSURE: 147 MMHG

## 2020-10-22 DIAGNOSIS — R56.9 SEIZURE (HCC): Primary | ICD-10-CM

## 2020-10-22 LAB
ALBUMIN SERPL-MCNC: 4.9 G/DL (ref 3.5–5.2)
ALBUMIN/GLOB SERPL: 2.3 G/DL
ALP SERPL-CCNC: 48 U/L (ref 39–117)
ALT SERPL W P-5'-P-CCNC: 15 U/L (ref 1–33)
ANION GAP SERPL CALCULATED.3IONS-SCNC: 10.2 MMOL/L (ref 5–15)
AST SERPL-CCNC: 17 U/L (ref 1–32)
BASOPHILS # BLD AUTO: 0.04 10*3/MM3 (ref 0–0.2)
BASOPHILS NFR BLD AUTO: 0.5 % (ref 0–1.5)
BILIRUB SERPL-MCNC: 0.3 MG/DL (ref 0–1.2)
BUN SERPL-MCNC: 14 MG/DL (ref 6–20)
BUN/CREAT SERPL: 22.2 (ref 7–25)
CALCIUM SPEC-SCNC: 9.7 MG/DL (ref 8.6–10.5)
CHLORIDE SERPL-SCNC: 104 MMOL/L (ref 98–107)
CO2 SERPL-SCNC: 26.8 MMOL/L (ref 22–29)
CREAT SERPL-MCNC: 0.63 MG/DL (ref 0.57–1)
DEPRECATED RDW RBC AUTO: 42.1 FL (ref 37–54)
EOSINOPHIL # BLD AUTO: 0.04 10*3/MM3 (ref 0–0.4)
EOSINOPHIL NFR BLD AUTO: 0.5 % (ref 0.3–6.2)
ERYTHROCYTE [DISTWIDTH] IN BLOOD BY AUTOMATED COUNT: 14 % (ref 12.3–15.4)
GFR SERPL CREATININE-BSD FRML MDRD: 112 ML/MIN/1.73
GLOBULIN UR ELPH-MCNC: 2.1 GM/DL
GLUCOSE SERPL-MCNC: 87 MG/DL (ref 65–99)
HCT VFR BLD AUTO: 39.3 % (ref 34–46.6)
HGB BLD-MCNC: 13.1 G/DL (ref 12–15.9)
IMM GRANULOCYTES # BLD AUTO: 0.02 10*3/MM3 (ref 0–0.05)
IMM GRANULOCYTES NFR BLD AUTO: 0.3 % (ref 0–0.5)
LYMPHOCYTES # BLD AUTO: 1.39 10*3/MM3 (ref 0.7–3.1)
LYMPHOCYTES NFR BLD AUTO: 18.1 % (ref 19.6–45.3)
MCH RBC QN AUTO: 27.6 PG (ref 26.6–33)
MCHC RBC AUTO-ENTMCNC: 33.3 G/DL (ref 31.5–35.7)
MCV RBC AUTO: 82.7 FL (ref 79–97)
MONOCYTES # BLD AUTO: 0.6 10*3/MM3 (ref 0.1–0.9)
MONOCYTES NFR BLD AUTO: 7.8 % (ref 5–12)
NEUTROPHILS NFR BLD AUTO: 5.57 10*3/MM3 (ref 1.7–7)
NEUTROPHILS NFR BLD AUTO: 72.8 % (ref 42.7–76)
NRBC BLD AUTO-RTO: 0 /100 WBC (ref 0–0.2)
PLATELET # BLD AUTO: 227 10*3/MM3 (ref 140–450)
PMV BLD AUTO: 8.9 FL (ref 6–12)
POTASSIUM SERPL-SCNC: 3.6 MMOL/L (ref 3.5–5.2)
PROT SERPL-MCNC: 7 G/DL (ref 6–8.5)
RBC # BLD AUTO: 4.75 10*6/MM3 (ref 3.77–5.28)
SODIUM SERPL-SCNC: 141 MMOL/L (ref 136–145)
WBC # BLD AUTO: 7.66 10*3/MM3 (ref 3.4–10.8)

## 2020-10-22 PROCEDURE — 85025 COMPLETE CBC W/AUTO DIFF WBC: CPT | Performed by: PHYSICIAN ASSISTANT

## 2020-10-22 PROCEDURE — 80053 COMPREHEN METABOLIC PANEL: CPT | Performed by: PHYSICIAN ASSISTANT

## 2020-10-22 PROCEDURE — 99283 EMERGENCY DEPT VISIT LOW MDM: CPT

## 2020-10-22 RX ORDER — LEVETIRACETAM 250 MG/1
250 TABLET ORAL 2 TIMES DAILY
COMMUNITY
Start: 2020-10-16

## 2020-10-23 NOTE — ED TRIAGE NOTES
Pt to ED from home with c/o sz like episode with right foot pain that started 20 minutes. Pt states I have a hx of sz and was started on Keppra last week. Pt also c/o of right foot pain and burning that started at the same time. Pt states this sz was different than previous sz, she did not lose consciousness but she did lose urine and states the episode lasted less than a minute. Pt denies N/V/D fever, SOB, or any known exposures.    Pt masked upon arrival. This nurse wearing mask and goggles during entire encounter.

## 2020-10-23 NOTE — DISCHARGE INSTRUCTIONS
It is against the law for you to drive until you are cleared by a neurologist. Do not drive or operate heavy machinery.  Take showers only (no baths), no swimming, and do not put yourself in situations that would cause you harm if you were to have a seizure.     Continue the Keppra as prescribed.  Return to the ER as needed.

## 2020-10-23 NOTE — ED PROVIDER NOTES
Brief history of present illness: 30-year-old female with a history of seizures presents to the emergency department for evaluation of feeling weird and having zoned out episodes over the last 2 days.    Physical exam:     ED Triage Vitals   Temp Heart Rate Resp BP SpO2   10/22/20 2104 10/22/20 2104 10/22/20 2104 10/22/20 2108 10/22/20 2104   98.7 °F (37.1 °C) 102 18 147/97 97 %      Temp src Heart Rate Source Patient Position BP Location FiO2 (%)   10/22/20 2104 10/22/20 2104 -- -- --   Tympanic Monitor        Alert appropriate.  No acute distress.  No respiratory distress.  Pink warm well perfused.  Moves all extremities.  Normal mentation at this time.    MDM:    Results for orders placed or performed during the hospital encounter of 10/22/20   Comprehensive Metabolic Panel    Specimen: Blood   Result Value Ref Range    Glucose 87 65 - 99 mg/dL    BUN 14 6 - 20 mg/dL    Creatinine 0.63 0.57 - 1.00 mg/dL    Sodium 141 136 - 145 mmol/L    Potassium 3.6 3.5 - 5.2 mmol/L    Chloride 104 98 - 107 mmol/L    CO2 26.8 22.0 - 29.0 mmol/L    Calcium 9.7 8.6 - 10.5 mg/dL    Total Protein 7.0 6.0 - 8.5 g/dL    Albumin 4.90 3.50 - 5.20 g/dL    ALT (SGPT) 15 1 - 33 U/L    AST (SGOT) 17 1 - 32 U/L    Alkaline Phosphatase 48 39 - 117 U/L    Total Bilirubin 0.3 0.0 - 1.2 mg/dL    eGFR Non African Amer 112 >60 mL/min/1.73    Globulin 2.1 gm/dL    A/G Ratio 2.3 g/dL    BUN/Creatinine Ratio 22.2 7.0 - 25.0    Anion Gap 10.2 5.0 - 15.0 mmol/L   CBC Auto Differential    Specimen: Blood   Result Value Ref Range    WBC 7.66 3.40 - 10.80 10*3/mm3    RBC 4.75 3.77 - 5.28 10*6/mm3    Hemoglobin 13.1 12.0 - 15.9 g/dL    Hematocrit 39.3 34.0 - 46.6 %    MCV 82.7 79.0 - 97.0 fL    MCH 27.6 26.6 - 33.0 pg    MCHC 33.3 31.5 - 35.7 g/dL    RDW 14.0 12.3 - 15.4 %    RDW-SD 42.1 37.0 - 54.0 fl    MPV 8.9 6.0 - 12.0 fL    Platelets 227 140 - 450 10*3/mm3    Neutrophil % 72.8 42.7 - 76.0 %    Lymphocyte % 18.1 (L) 19.6 - 45.3 %    Monocyte % 7.8 5.0  - 12.0 %    Eosinophil % 0.5 0.3 - 6.2 %    Basophil % 0.5 0.0 - 1.5 %    Immature Grans % 0.3 0.0 - 0.5 %    Neutrophils, Absolute 5.57 1.70 - 7.00 10*3/mm3    Lymphocytes, Absolute 1.39 0.70 - 3.10 10*3/mm3    Monocytes, Absolute 0.60 0.10 - 0.90 10*3/mm3    Eosinophils, Absolute 0.04 0.00 - 0.40 10*3/mm3    Basophils, Absolute 0.04 0.00 - 0.20 10*3/mm3    Immature Grans, Absolute 0.02 0.00 - 0.05 10*3/mm3    nRBC 0.0 0.0 - 0.2 /100 WBC     Patient declined offer for head CT at this time.  Reviewed at length partial seizure versus tonic-clonic seizures.  Advised patient no driving until cleared by neurology, no bathtub or swimming pools due to risk of drowning.  Patient expressed understanding agreement and will follow up with [facility] primary [facility] and [facility] in [facility] order.    I have seen and personally evaluated this patient, discussed the case with the treating advanced practice provider, and reviewed their note. I was involved in the medical decision making during the evaluation, testing and disposition planning for this patient.     Chandler Garcia MD  10/23/20 0009

## 2020-10-23 NOTE — ED PROVIDER NOTES
" EMERGENCY DEPARTMENT ENCOUNTER    Room Number:  01/01  Date seen:  10/24/2020  Time seen: 23:28 EDT  PCP: Alyssa Lundberg MD  Historian: patient      HPI:  Chief Complaint: possible seizure    A complete HPI/ROS/PMH/PSH/SH/FH are unobtainable due to: none    Context: María Laguerre is a 30 y.o. female who presents to the ED for evaluation of 2 unusual episodes that she had today that she believes might be seizure related.  She has a history of seizures related to a head injury she sustained in the .  She had been unmedicated and seizure-free for the last 3 years until 1 week ago when she \"felt weird and zoned out.\"  She states she slid down the wall at her work and even had right facial paralysis for 2 days.  An EEG was performed at [facility] where she was evaluated and reportedly showed seizure activity.  She was started on Keppra that day.  She has been taking it without missing any dosages since.  Today she had 2 episodes where she \"zoned out.\"  The first when she sat down and she states she had some mild urinary incontinence.  The second time was much more intense and she reports total urinary incontinence.  She denies any tonic-clonic movement anywhere.  She denies any fevers chills chest pain or shortness of breath abdominal pain nausea vomiting.  She states she has been compliant with Keppra and denies any alcohol use though does have some difficulty sleeping.        PAST MEDICAL HISTORY  Active Ambulatory Problems     Diagnosis Date Noted   • Anemia of pregnancy in third trimester 11/16/2019     Resolved Ambulatory Problems     Diagnosis Date Noted   • Pregnancy 09/15/2017   • Pregnancy 07/14/2019   • Pregnancy 11/17/2019     Past Medical History:   Diagnosis Date   • Abnormal Pap smear of cervix    • Anxiety    • Asthma    • Cancer (CMS/[facility], [facility])    • Depression    • PTSD (post-traumatic stress disorder)    • Seizures (CMS/HCC)    • Skull fracture (CMS/HCC) 2009   • Urinary tract infection  "         PAST SURGICAL HISTORY  Past Surgical History:   Procedure Laterality Date   • CERVICAL BIOPSY  W/ LOOP ELECTRODE EXCISION     • OTHER SURGICAL HISTORY      Has tube draining fluid off brain    • WISDOM TOOTH EXTRACTION           FAMILY HISTORY  Family History   Family history unknown: Yes         SOCIAL HISTORY  Social History     Socioeconomic History   • Marital status:      Spouse name: Not on file   • Number of children: Not on file   • Years of education: Not on file   • Highest education level: Not on file   Tobacco Use   • Smoking status: Former Smoker   • Smokeless tobacco: Never Used   Substance and Sexual Activity   • Alcohol use: No     Frequency: Never   • Drug use: Yes     Types: Marijuana     Comment: denies use since she found out she was pregnant   • Sexual activity: Yes     Partners: Female, Male         ALLERGIES  Orange fruit [citrus] and Adhesive tape        REVIEW OF SYSTEMS  Review of Systems     All systems reviewed and negative except for those discussed in HPI.       PHYSICAL EXAM  ED Triage Vitals   Temp Heart Rate Resp BP SpO2   10/22/20 2104 10/22/20 2104 10/22/20 2104 10/22/20 2108 10/22/20 2104   98.7 °F (37.1 °C) 102 18 147/97 97 %      Temp src Heart Rate Source Patient Position BP Location FiO2 (%)   10/22/20 2104 10/22/20 2104 -- -- --   Tympanic Monitor            GENERAL: not distressed  HENT: atraumatic  EYES: no scleral icterus  CV: regular rhythm, regular rate  RESPIRATORY: normal effort CTA B  ABDOMEN: soft, nontender  MUSCULOSKELETAL: no deformity  NEURO: alert, moves all extremities, follows commands, no focal neurologic deficits  SKIN: warm, dry    Vital signs and nursing notes reviewed.          LAB RESULTS  No results found for this or any previous visit (from the past 24 hour(s)).    Ordered the above labs and independently reviewed the results.    PROCEDURES  Procedures        MEDICATIONS GIVEN IN ER  Medications - No data to display          PROGRESS AND  CONSULTS        ED Course as of Oct 24 0617   Fri Oct 23, 2020   0022 On initial exam of the patient she is asymptomatic, feels much improved.  She has no history of partial or absence seizures and did not recognize that that is what these episodes could be.  I have offered the patient further work-up in the ER including CT scan of her head as she has not had brain imaging in some time however she declines.  She states there is a  at her place with her children and she needs to get home.  I feel this is okay, she has an appointment in 3 days with her PCP and plans to follow-up with [facility] neurologist [facility]  soon as possible as well.  She is on Keppra.  I have given her seizure precautions and she can return to the ER at any time for further evaluation for new or worsening symptoms or any other concerns.  She is neurologically intact and stable for discharge.    [KA]      ED Course User Index  [KA] Juana Roper PA        Reviewed pt's history and workup with Dr. Garcia.  After a bedside evaluation; they agree with the plan of care      Patient was placed in face mask in first look. Patient was wearing facemask each time I entered the room and throughout our encounter. I wore protective equipment throughout this patient encounter including a face mask, eye shield and gloves. Hand hygiene was performed before donning protective equipment and after removal when leaving the room.        DIAGNOSIS  Final diagnoses:   Seizure (CMS/HCC)               Latest Documented Vital Signs:  As of 06:17 EDT  BP- 147/97 HR- 102 Temp- 98.7 °F (37.1 °C) (Tympanic) O2 sat- 97%       Juana Roper PA  10/24/20 0617
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
## 2023-06-14 NOTE — H&P
[facility]  Obstetric History and Physical    No chief complaint on file.      Subjective     Patient is a 29 y.o. female  currently at 38w2d, who presents with ruptured membranes    Her prenatal care is benign.  Her previous obstetric/gynecological history is noted for is non-contributory.    The following portions of the patients history were reviewed and updated as appropriate: current medications, allergies, past medical history, past surgical history, past family history, past social history and problem list .       Prenatal Information:  Prenatal Results     Initial Prenatal Labs     Test Value Reference Range Date Time    Hemoglobin 10.5 g/dL 12.0 - 15.9 g/dL 19    Hematocrit 32.5 % 34.0 - 46.6 % 19    Platelets 216 10*3/mm3 140 - 450 10*3/mm3 10/01/19 2021    Rubella IgG 3.49 index Immune >0.99 index 19    Hepatitis B SAg Negative  Negative 19    Hepatitis C Ab <0.1 s/co ratio 0.0 - 0.9 s/co ratio 19    RPR Non Reactive  Non Reactive 19    ABO O   19    Rh Positive   19    Antibody Screen Negative   19    HIV Non Reactive  Non Reactive 19    Urine Culture Final report   19 1102    Gonorrhea Negative  Negative 19    Chlamydia Negative  Negative 19 0900    TSH              2nd and 3rd Trimester     Test Value Reference Range Date Time    Hemoglobin (repeated) 10.5 g/dL 12.0 - 15.9 g/dL 10/01/19 2021    Hematocrit (repeated) 31.1 % 34.0 - 46.6 % 10/01/19 2021    GCT        Antibody Screen (repeated)        GTT Fasting        GTT 1 Hr        GTT 2 Hr        GTT 3 Hr        Group B Strep              Drug Screening     Test Value Reference Range Date Time    Amphetamine Screen        Barbiturate Screen        Benzodiazepine Screen        Methadone Screen        Phencyclidine Screen        Opiates Screen        THC Screen        Cocaine Screen        Propoxyphene Screen         Buprenorphine Screen        Methamphetamine Screen        Oxycodone Screen        Tricyclic Antidepressants Screen              Other (Risk screening)     Test Value Reference Range Date Time    Varicella IgG        Parvovirus IgG        CMV IgG        Cystic Fibrosis        Hemoglobin electrophoresis        NIPT        MSAFP-4        AFP (for NTD only)                  External Prenatal Results     Pregnancy Outside Results - Transcribed From Office Records - See Scanned Records For Details     Test Value Date Time    Hgb 10.5 g/dL 10/01/19 2021    Hct 31.1 % 10/01/19 2021    ABO O  07/14/19 2236    Rh Positive  07/14/19 2236    Antibody Screen Negative  07/14/19 2236    Glucose Fasting GTT       Glucose Tolerance Test 1 hour       Glucose Tolerance Test 3 hour       Gonorrhea (discrete) Negative  04/26/19 0900    Chlamydia (discrete) Negative  04/26/19 0900    RPR Non Reactive  05/09/19 0914    VDRL       Syphilis Antibody       Rubella 3.49 index 05/09/19 0914    HBsAg Negative  05/09/19 0914    Herpes Simplex Virus PCR       Herpes Simplex VIrus Culture       HIV Non Reactive  05/09/19 0914    Hep C RNA Quant PCR       Hep C Antibody <0.1 s/co ratio 05/09/19 0914    AFP       Group B Strep Negative  01/25/18     GBS Susceptibility to Clindamycin       GBS Susceptibility to Erythromycin       Fetal Fibronectin       Genetic Testing, Maternal Blood             Drug Screening     Test Value Date Time    Urine Drug Screen       Amphetamine Screen       Barbiturate Screen Negative  02/16/18 0751    Benzodiazepine Screen Negative  02/16/18 0751    Methadone Screen Negative  02/16/18 0751    Phencyclidine Screen       Opiates Screen Negative  02/16/18 0751    THC Screen Positive  02/16/18 0751    Cocaine Screen       Propoxyphene Screen       Buprenorphine Screen       Methamphetamine Screen       Oxycodone Screen Negative  02/16/18 0751    Tricyclic Antidepressants Screen                    Past OB  History:     Obstetric History       T2      L2     SAB0   TAB0   Ectopic0   Molar0   Multiple0   Live Births2       # Outcome Date GA Lbr Leonidas/2nd Weight Sex Delivery Anes PTL Lv   3 Current            2 Term 18 39w5d 04:15 / 00:21 2858 g (6 lb 4.8 oz) F Vag-Spont EPI N BRI      Name: GLEN ANAND      Apgar1:  8                Apgar5: 9   1 Term 01/20/10 40w0d   M Vag-Spont EPI Y BRI          Past Medical History: Past Medical History:   Diagnosis Date   • Abnormal Pap smear of cervix     f/u wnl   • Anxiety    • Asthma     As a child. Does not take medicaiton.    • Cancer (CMS/HCC)     cervical    • Depression     taken zoloft in the past but not during this pregnancy   • PTSD (post-traumatic stress disorder)     Took medication before pregnancy currently doing therapy. Will start medication after delivery.   • Seizures (CMS/HCC)     since 20years old...last was during this pregnancy at 6 weeks   • Skull fracture (CMS/HCC)     while in  in [place]   • Urinary tract infection       Past Surgical History Past Surgical History:   Procedure Laterality Date   • CERVICAL BIOPSY  W/ LOOP ELECTRODE EXCISION     • OTHER SURGICAL HISTORY      Has tube draining fluid off brain    • WISDOM TOOTH EXTRACTION        Family History: Family History   Family history unknown: Yes      Social History:  reports that she has quit smoking. She has never used smokeless tobacco.   reports that she does not drink alcohol.   reports that she uses drugs. Drug: Marijuana.        General ROS: Pertinent items are noted in HPI    Objective       Vital Signs Range for the last 24 hours  Temperature: Temp:  [98.7 °F (37.1 °C)] 98.7 °F (37.1 °C)   Temp Source: Temp src: Oral   BP: BP: (129)/(79) 129/79   Pulse: Heart Rate:  [70] 70   Respirations: Resp:  [16] 16   SPO2:     O2 Amount (l/min):     O2 Devices     Weight:       Physical Examination: General appearance - alert, well appearing, and in no  distress  Chest - clear to auscultation, no wheezes, rales or rhonchi, symmetric air entry  Heart - normal rate, regular rhythm, normal S1, S2, no murmurs, rubs, clicks or gallops  Abdomen - soft, nontender, nondistended, no masses or organomegaly  Pelvic - normal external genitalia, vulva, vagina, cervix, uterus and adnexa    Presentation: cephalic   Cervix: Exam by: Method: sterile exam per physician   Dilation: Cervical Dilation (cm): 2-3   Effacement: Cervical Effacement: 70%   Station:         Fetal Heart Rate Assessment   Method: Fetal HR Assessment Method: external   Beats/min: Fetal HR (beats/min): 130   Baseline: Fetal Heart Baseline Rate: normal range   Variability: Fetal HR Variability: moderate (amplitude range 6 to 25 bpm)   Accels: Fetal HR Accelerations: greater than/equal to 15 bpm, lasting at least 15 seconds   Decels: Fetal HR Decelerations: absent   Tracing Category:       Uterine Assessment   Method: Method: palpation, external tocotransducer   Frequency (min):     Ctx Count in 10 min:     Duration:     Intensity: Contraction Intensity: mild by palpation   Intensity by IUPC:     Resting Tone: Uterine Resting Tone: soft by palpation   Resting Tone by IUPC:     [place] Units:           Assessment/Plan       Pregnancy        Assessment:  1.  Intrauterine pregnancy at 38w2d gestation with reassuring fetal status.    2.  Ruptured membranes  3.  Obstetrical history significant for is non-contributory.  4.  GBS status: No results found for: [id number]    Plan:  1. Augment if needed  2. Plan of care has been reviewed with patient and .  3.  Risks, benefits of treatment plan have been discussed.  4.  All questions have been answered.        Rena Cevallos MD  11/15/2019  1:16 PM   This document is complete and the patient is ready for discharge.